# Patient Record
Sex: MALE | Race: WHITE | NOT HISPANIC OR LATINO | URBAN - METROPOLITAN AREA
[De-identification: names, ages, dates, MRNs, and addresses within clinical notes are randomized per-mention and may not be internally consistent; named-entity substitution may affect disease eponyms.]

---

## 2023-03-17 ENCOUNTER — INPATIENT (INPATIENT)
Facility: HOSPITAL | Age: 68
LOS: 2 days | Discharge: HOME | DRG: 862 | End: 2023-03-20
Attending: INTERNAL MEDICINE | Admitting: HOSPITALIST
Payer: COMMERCIAL

## 2023-03-17 VITALS
DIASTOLIC BLOOD PRESSURE: 64 MMHG | WEIGHT: 190.04 LBS | HEART RATE: 73 BPM | TEMPERATURE: 99 F | HEIGHT: 66 IN | SYSTOLIC BLOOD PRESSURE: 108 MMHG | RESPIRATION RATE: 18 BRPM | OXYGEN SATURATION: 98 %

## 2023-03-17 DIAGNOSIS — R18.8 OTHER ASCITES: ICD-10-CM

## 2023-03-17 LAB
ALBUMIN SERPL ELPH-MCNC: 2.7 G/DL — LOW (ref 3.3–5)
ALP SERPL-CCNC: 84 U/L — SIGNIFICANT CHANGE UP (ref 40–120)
ALT FLD-CCNC: 25 U/L — SIGNIFICANT CHANGE UP (ref 12–78)
ANION GAP SERPL CALC-SCNC: 6 MMOL/L — SIGNIFICANT CHANGE UP (ref 5–17)
APPEARANCE UR: CLEAR — SIGNIFICANT CHANGE UP
APTT BLD: 25.4 SEC — LOW (ref 27.5–35.5)
AST SERPL-CCNC: 30 U/L — SIGNIFICANT CHANGE UP (ref 15–37)
BACTERIA # UR AUTO: ABNORMAL
BASOPHILS # BLD AUTO: 0.06 K/UL — SIGNIFICANT CHANGE UP (ref 0–0.2)
BASOPHILS NFR BLD AUTO: 0.4 % — SIGNIFICANT CHANGE UP (ref 0–2)
BILIRUB SERPL-MCNC: 2.1 MG/DL — HIGH (ref 0.2–1.2)
BILIRUB UR-MCNC: NEGATIVE — SIGNIFICANT CHANGE UP
BUN SERPL-MCNC: 27 MG/DL — HIGH (ref 7–23)
CALCIUM SERPL-MCNC: 9.2 MG/DL — SIGNIFICANT CHANGE UP (ref 8.5–10.1)
CHLORIDE SERPL-SCNC: 103 MMOL/L — SIGNIFICANT CHANGE UP (ref 96–108)
CO2 SERPL-SCNC: 28 MMOL/L — SIGNIFICANT CHANGE UP (ref 22–31)
COLOR SPEC: YELLOW — SIGNIFICANT CHANGE UP
COMMENT - URINE: SIGNIFICANT CHANGE UP
CREAT SERPL-MCNC: 1.16 MG/DL — SIGNIFICANT CHANGE UP (ref 0.5–1.3)
DIFF PNL FLD: ABNORMAL
EGFR: 69 ML/MIN/1.73M2 — SIGNIFICANT CHANGE UP
EOSINOPHIL # BLD AUTO: 0.02 K/UL — SIGNIFICANT CHANGE UP (ref 0–0.5)
EOSINOPHIL NFR BLD AUTO: 0.1 % — SIGNIFICANT CHANGE UP (ref 0–6)
EPI CELLS # UR: SIGNIFICANT CHANGE UP
FLUAV AG NPH QL: SIGNIFICANT CHANGE UP
FLUBV AG NPH QL: SIGNIFICANT CHANGE UP
GLUCOSE SERPL-MCNC: 145 MG/DL — HIGH (ref 70–99)
GLUCOSE UR QL: NEGATIVE — SIGNIFICANT CHANGE UP
GRAN CASTS # UR COMP ASSIST: ABNORMAL /LPF
HCT VFR BLD CALC: 38.5 % — LOW (ref 39–50)
HGB BLD-MCNC: 13.1 G/DL — SIGNIFICANT CHANGE UP (ref 13–17)
HYALINE CASTS # UR AUTO: ABNORMAL /LPF
IMM GRANULOCYTES NFR BLD AUTO: 1.5 % — HIGH (ref 0–0.9)
INR BLD: 1.3 RATIO — HIGH (ref 0.88–1.16)
INR BLD: 1.37 RATIO — HIGH (ref 0.88–1.16)
KETONES UR-MCNC: NEGATIVE — SIGNIFICANT CHANGE UP
LACTATE SERPL-SCNC: 1.1 MMOL/L — SIGNIFICANT CHANGE UP (ref 0.7–2)
LEUKOCYTE ESTERASE UR-ACNC: ABNORMAL
LIDOCAIN IGE QN: 112 U/L — SIGNIFICANT CHANGE UP (ref 73–393)
LYMPHOCYTES # BLD AUTO: 0.54 K/UL — LOW (ref 1–3.3)
LYMPHOCYTES # BLD AUTO: 4 % — LOW (ref 13–44)
MCHC RBC-ENTMCNC: 30.7 PG — SIGNIFICANT CHANGE UP (ref 27–34)
MCHC RBC-ENTMCNC: 34 GM/DL — SIGNIFICANT CHANGE UP (ref 32–36)
MCV RBC AUTO: 90.2 FL — SIGNIFICANT CHANGE UP (ref 80–100)
MONOCYTES # BLD AUTO: 0.83 K/UL — SIGNIFICANT CHANGE UP (ref 0–0.9)
MONOCYTES NFR BLD AUTO: 6.2 % — SIGNIFICANT CHANGE UP (ref 2–14)
NEUTROPHILS # BLD AUTO: 11.73 K/UL — HIGH (ref 1.8–7.4)
NEUTROPHILS NFR BLD AUTO: 87.8 % — HIGH (ref 43–77)
NITRITE UR-MCNC: NEGATIVE — SIGNIFICANT CHANGE UP
PH UR: 5 — SIGNIFICANT CHANGE UP (ref 5–8)
PLATELET # BLD AUTO: 258 K/UL — SIGNIFICANT CHANGE UP (ref 150–400)
POTASSIUM SERPL-MCNC: 3.2 MMOL/L — LOW (ref 3.5–5.3)
POTASSIUM SERPL-SCNC: 3.2 MMOL/L — LOW (ref 3.5–5.3)
PROT SERPL-MCNC: 7.4 GM/DL — SIGNIFICANT CHANGE UP (ref 6–8.3)
PROT UR-MCNC: 30 MG/DL
PROTHROM AB SERPL-ACNC: 15.1 SEC — HIGH (ref 10.5–13.4)
PROTHROM AB SERPL-ACNC: 15.9 SEC — HIGH (ref 10.5–13.4)
RBC # BLD: 4.27 M/UL — SIGNIFICANT CHANGE UP (ref 4.2–5.8)
RBC # FLD: 12.3 % — SIGNIFICANT CHANGE UP (ref 10.3–14.5)
RBC CASTS # UR COMP ASSIST: SIGNIFICANT CHANGE UP /HPF (ref 0–4)
RSV RNA NPH QL NAA+NON-PROBE: SIGNIFICANT CHANGE UP
SARS-COV-2 RNA SPEC QL NAA+PROBE: SIGNIFICANT CHANGE UP
SODIUM SERPL-SCNC: 137 MMOL/L — SIGNIFICANT CHANGE UP (ref 135–145)
SP GR SPEC: 1.01 — SIGNIFICANT CHANGE UP (ref 1.01–1.02)
UROBILINOGEN FLD QL: 4
WBC # BLD: 13.38 K/UL — HIGH (ref 3.8–10.5)
WBC # FLD AUTO: 13.38 K/UL — HIGH (ref 3.8–10.5)
WBC UR QL: ABNORMAL /HPF (ref 0–5)

## 2023-03-17 PROCEDURE — 49406 IMAGE CATH FLUID PERI/RETRO: CPT

## 2023-03-17 PROCEDURE — 83735 ASSAY OF MAGNESIUM: CPT

## 2023-03-17 PROCEDURE — 87075 CULTR BACTERIA EXCEPT BLOOD: CPT

## 2023-03-17 PROCEDURE — 80053 COMPREHEN METABOLIC PANEL: CPT

## 2023-03-17 PROCEDURE — 85027 COMPLETE CBC AUTOMATED: CPT

## 2023-03-17 PROCEDURE — 36415 COLL VENOUS BLD VENIPUNCTURE: CPT

## 2023-03-17 PROCEDURE — 85025 COMPLETE CBC W/AUTO DIFF WBC: CPT

## 2023-03-17 PROCEDURE — C1769: CPT

## 2023-03-17 PROCEDURE — 84478 ASSAY OF TRIGLYCERIDES: CPT

## 2023-03-17 PROCEDURE — 80048 BASIC METABOLIC PNL TOTAL CA: CPT

## 2023-03-17 PROCEDURE — 99285 EMERGENCY DEPT VISIT HI MDM: CPT

## 2023-03-17 PROCEDURE — 74177 CT ABD & PELVIS W/CONTRAST: CPT | Mod: 26,ME

## 2023-03-17 PROCEDURE — G1004: CPT

## 2023-03-17 PROCEDURE — 86803 HEPATITIS C AB TEST: CPT

## 2023-03-17 PROCEDURE — 82570 ASSAY OF URINE CREATININE: CPT

## 2023-03-17 PROCEDURE — 71045 X-RAY EXAM CHEST 1 VIEW: CPT | Mod: 26

## 2023-03-17 PROCEDURE — 99223 1ST HOSP IP/OBS HIGH 75: CPT

## 2023-03-17 PROCEDURE — 84100 ASSAY OF PHOSPHORUS: CPT

## 2023-03-17 PROCEDURE — 87070 CULTURE OTHR SPECIMN AEROBIC: CPT

## 2023-03-17 PROCEDURE — 86140 C-REACTIVE PROTEIN: CPT

## 2023-03-17 PROCEDURE — 93010 ELECTROCARDIOGRAM REPORT: CPT

## 2023-03-17 PROCEDURE — C1729: CPT

## 2023-03-17 RX ORDER — ENOXAPARIN SODIUM 100 MG/ML
40 INJECTION SUBCUTANEOUS EVERY 24 HOURS
Refills: 0 | Status: DISCONTINUED | OUTPATIENT
Start: 2023-03-17 | End: 2023-03-20

## 2023-03-17 RX ORDER — PIPERACILLIN AND TAZOBACTAM 4; .5 G/20ML; G/20ML
3.38 INJECTION, POWDER, LYOPHILIZED, FOR SOLUTION INTRAVENOUS EVERY 8 HOURS
Refills: 0 | Status: DISCONTINUED | OUTPATIENT
Start: 2023-03-18 | End: 2023-03-20

## 2023-03-17 RX ORDER — IBUPROFEN 200 MG
200 TABLET ORAL EVERY 6 HOURS
Refills: 0 | Status: DISCONTINUED | OUTPATIENT
Start: 2023-03-17 | End: 2023-03-20

## 2023-03-17 RX ORDER — METOPROLOL TARTRATE 50 MG
1 TABLET ORAL
Qty: 0 | Refills: 0 | DISCHARGE

## 2023-03-17 RX ORDER — OXYCODONE HYDROCHLORIDE 5 MG/1
5 TABLET ORAL ONCE
Refills: 0 | Status: DISCONTINUED | OUTPATIENT
Start: 2023-03-17 | End: 2023-03-17

## 2023-03-17 RX ORDER — GABAPENTIN 400 MG/1
100 CAPSULE ORAL EVERY 8 HOURS
Refills: 0 | Status: DISCONTINUED | OUTPATIENT
Start: 2023-03-17 | End: 2023-03-20

## 2023-03-17 RX ORDER — FENTANYL CITRATE 50 UG/ML
50 INJECTION INTRAVENOUS
Refills: 0 | Status: DISCONTINUED | OUTPATIENT
Start: 2023-03-17 | End: 2023-03-17

## 2023-03-17 RX ORDER — IBUPROFEN 200 MG
1 TABLET ORAL
Qty: 0 | Refills: 0 | DISCHARGE

## 2023-03-17 RX ORDER — ONDANSETRON 8 MG/1
4 TABLET, FILM COATED ORAL ONCE
Refills: 0 | Status: DISCONTINUED | OUTPATIENT
Start: 2023-03-17 | End: 2023-03-17

## 2023-03-17 RX ORDER — VALSARTAN 80 MG/1
160 TABLET ORAL DAILY
Refills: 0 | Status: DISCONTINUED | OUTPATIENT
Start: 2023-03-17 | End: 2023-03-20

## 2023-03-17 RX ORDER — PIPERACILLIN AND TAZOBACTAM 4; .5 G/20ML; G/20ML
3.38 INJECTION, POWDER, LYOPHILIZED, FOR SOLUTION INTRAVENOUS ONCE
Refills: 0 | Status: COMPLETED | OUTPATIENT
Start: 2023-03-17 | End: 2023-03-17

## 2023-03-17 RX ORDER — METOPROLOL TARTRATE 50 MG
50 TABLET ORAL DAILY
Refills: 0 | Status: DISCONTINUED | OUTPATIENT
Start: 2023-03-17 | End: 2023-03-20

## 2023-03-17 RX ORDER — SODIUM CHLORIDE 9 MG/ML
2000 INJECTION INTRAMUSCULAR; INTRAVENOUS; SUBCUTANEOUS ONCE
Refills: 0 | Status: COMPLETED | OUTPATIENT
Start: 2023-03-17 | End: 2023-03-17

## 2023-03-17 RX ORDER — SODIUM CHLORIDE 9 MG/ML
1000 INJECTION, SOLUTION INTRAVENOUS
Refills: 0 | Status: DISCONTINUED | OUTPATIENT
Start: 2023-03-17 | End: 2023-03-17

## 2023-03-17 RX ADMIN — SODIUM CHLORIDE 2000 MILLILITER(S): 9 INJECTION INTRAMUSCULAR; INTRAVENOUS; SUBCUTANEOUS at 10:29

## 2023-03-17 RX ADMIN — PIPERACILLIN AND TAZOBACTAM 200 GRAM(S): 4; .5 INJECTION, POWDER, LYOPHILIZED, FOR SOLUTION INTRAVENOUS at 15:29

## 2023-03-17 NOTE — BRIEF OPERATIVE NOTE - OPERATION/FINDINGS
12F drain placed into pelvic collection, 360cc clear yellow fluid removed, drain left in place to gravity

## 2023-03-17 NOTE — H&P ADULT - HISTORY OF PRESENT ILLNESS
Pt is a pleasant 67 y/o Male with a PMHx of prostatectomy and lymph node removal presents to the ED sent in by urologist for accumulation of fluid in the peritoneal cavity. As per wife, the pt had viral sx for the past week with intermittent fevers, nausea and dry heaving, diarrhea, and general myalgias. Today, the pt was evaluated by urologist Dr. Ruiz for dark colored urine and hematuria who sent the pt to the ED for fluid retention in peritoneal cavity at site of lymph nodes removal. Pt is a pleasant 67 y/o Male with a PMHx of prostatectomy and lymph node removal in Dec 2022 at [facility] who was sent by his urologist, Dr. Ruiz,  to [place] ED for  pelvic pain.   Dr. Ruiz noted pt had  dark colored urine and hematuria.  He who found pt has  fluid retention,   accumulation of fluid in the peritoneal cavity,  at the site of lymph nodes removal.     For the  past week pt has had intermittent fevers/chills, nausea and dry heaving, diarrhea, and general myalgias.   He denies respiratory complaints,  no chest pain/SOB,  no urinary burning or pain.

## 2023-03-17 NOTE — H&P ADULT - NSHPOUTPATIENTPROVIDERS_GEN_ALL_CORE
Urologist: Dr Ruiz: [phone]. Urologist: Dr Ruiz: [phone].,  [facility]

## 2023-03-17 NOTE — ED PROVIDER NOTE - NS ED ROS FT
Gen: +fever, normal appetite  Eyes: No eye irritation or discharge  ENT: No ear pain, congestion, sore throat  Resp: No cough or trouble breathing  Cardiovascular: No chest pain or palpitation  Gastroenteric: +nausea, no vomiting, +diarrhea, constipation  :  +dark colored urine; no dysuria  MS: general myalgias  Skin: No rashes  Neuro: No headache; no abnormal movements  Remainder negative, except as per the HPI

## 2023-03-17 NOTE — H&P ADULT - NSHPPHYSICALEXAM_GEN_ALL_CORE
ICU Vital Signs Last 24 Hrs  T(C): 38.8 (17 Mar 2023 17:45), Max: 39.3 (17 Mar 2023 17:30)  T(F): 101.9 (17 Mar 2023 17:45), Max: 102.7 (17 Mar 2023 17:30)  HR: 75 (17 Mar 2023 17:45) (63 - 75)  BP: 124/66 (17 Mar 2023 17:45) (108/64 - 146/81)  BP(mean): 98 (17 Mar 2023 15:34) (48 - 98)    RR: 26 (17 Mar 2023 17:45) (18 - 30)   SpO2: 96% (17 Mar 2023 17:45) (95% - 99%)    O2 Parameters below as of 17 Mar 2023 17:45  Patient On (Oxygen Delivery Method): nasal cannula  O2 Flow (L/min): 2

## 2023-03-17 NOTE — ED PROVIDER NOTE - OBJECTIVE STATEMENT
69 y/o M with a PMHx of prostatectomy and lymph node removal presents to the ED sent in by urologist for accumulation of fluid in the peritoneal cavity. As per wife, the pt had viral sx for the past week with intermittent fevers, nausea and dry heaving, diarrhea, and general myalgias. Today, the pt was evaluated by urologist Dr. Ruiz for dark colored urine and hematuria who sent the pt to the ED for fluid retention in peritoneal cavity at site of lymph nodes removal. Urologist: Dr Ruiz: [phone].

## 2023-03-17 NOTE — PRE PROCEDURE NOTE - PRE PROCEDURE EVALUATION
67yo M, pt of Urologist Dr Ruiz [phone], s/p prostatectomy and lymph node excision 2 months ago with pelvic pain and low grade fevers since Tuesday. CT imaging significant for large anterior pelvic fluid collection. IR consulted for drainage. Pt seen in ED:  - Pt had yogurt at with fruit at 8:30AM, CT with oral contrast at noon  - Not on AC  - Pt is consentable, covid negative  - Case reviewed with Dr. Dougherty, plan for IR drainage today around 4pm

## 2023-03-17 NOTE — H&P ADULT - NSHPLABSRESULTS_GEN_ALL_CORE
My interpretation:    EKG:  NSR , 65 bpm          < from: CT Abdomen and Pelvis w/ Oral Cont and w/ IV Cont (03.17.23 @ 11:48) >  ACC: [id number] EXAM:  CT ABDOMEN AND PELVIS OC IC   ORDERED BY: WON MORENO     PROCEDURE DATE:  03/17/2023      INTERPRETATION:  CLINICAL INFORMATION: Abdominal pain    COMPARISON: None.    CONTRAST/COMPLICATIONS:  IV Contrast: Omnipaque 27999 cc administered   10 cc discarded  Oral Contrast: Omnipaque 300  Complications: None reported at time of study completion    PROCEDURE:  CT of the Abdomen and Pelvis was performed.  Sagittal and coronal reformats were performed.    FINDINGS:  LOWER CHEST: Basilar atelectasis and scarring. Coronary artery   calcifications.    LIVER: Multiple cysts.  BILE DUCTS: Normal caliber.  GALLBLADDER: Within normal limits.  SPLEEN: Within normal limits.  PANCREAS: Within normal limits.  ADRENALS: Within normal limits.  KIDNEYS/URETERS: Subcentimeter renal hypodensities, too small to further   characterize. No hydronephrosis.    BLADDER: Minimally distended. Wall thickening and inflammatory changes,   possibly reactive.  REPRODUCTIVE ORGANS: Prostatectomy.    BOWEL: No bowel obstruction. Appendix is within normal limits.  PERITONEUM: Lower abdominal/pelvic fluid collection measuring   approximately 13.1 x 7.3 x 9.1 cm and located anterior to the urinary   bladder.  VESSELS: Atherosclerotic changes.Accessory left renal arteries.  RETROPERITONEUM/LYMPH NODES: Prominent retroperitoneal lymph nodes.  ABDOMINAL WALL: Within normal limits.  BONES: Degenerative changes.    IMPRESSION:  Lower abdominal/pelvic fluid collection (13.1 x 7.3 x 9.1 cm) anterior to   the urinary bladder.  Urinary bladder wall thickening and inflammatory changes, possibly   reactive.      --- End of Report ---  < end of copied text >

## 2023-03-17 NOTE — PHARMACOTHERAPY INTERVENTION NOTE - COMMENTS
Medication reconciliation completed.  Reviewed Medication list and confirmed med allergies with patient; confirmed with Dr. First Meddejuan.

## 2023-03-17 NOTE — ED ADULT NURSE REASSESSMENT NOTE - NS ED NURSE REASSESS COMMENT FT1
Continued to assess patient reaction to antibiotic zosyn. Pt does not reports allergic reaction/hives. No s/s of allergic reaction noted. Pt remains in position of comfort. No signs of distress noted. Report given to IR RN. Pt transferred out of unit with zosyn infusion.

## 2023-03-17 NOTE — ED ADULT NURSE REASSESSMENT NOTE - NS ED NURSE REASSESS COMMENT FT1
Pt reports being allergic reaction to PNC. Pt reports hives occur. Pt reports that last time he had penicillin was in his early childhood. MD Kimbrough aware of allergic reaction to PNC. MD Kimbrough Ok to zosyn administration. Zosyn antibiotic started. Pt does not report allergic reaction. No allergic reaction noted. Pt remain in position comfort. No signs of distress noted.

## 2023-03-17 NOTE — ED ADULT NURSE NOTE - OBJECTIVE STATEMENT
68y male presents to the ED sent by urologist for accumulation of fluid in the peritoneal cavity. Wife at bedside. As per wife, "pt had a viral infection over the past week, with fevers, nausea, and diarrhea." Wife reports "he has dark yellow samira urine which made me think he had a UTI." Pt went to urologist office today and was adviced to come to ED for fluid retention. Pt is c/o RLQ pain when pressure is applied.

## 2023-03-17 NOTE — ED ADULT NURSE NOTE - CHIEF COMPLAINT QUOTE
pt underwent prostatectomy in December, had lymph nodes removed and now has accumulation of fluid in peritoneal cavity requiring cat scan imaging and possible "removal of fluid" as per wife. pt has been febrile x4 days, tmax 101. pt c/o RLQ abdominal pain as well as generalized weakness/dizziness and lightheadedness.

## 2023-03-17 NOTE — H&P ADULT - ASSESSMENT
Pt is admitted w/    Abd pain  Fever 102.7 F  Peritoneal fluid collection  Hypokalemia  Hx of Prostatectomy  UA  mildly positive, hematuria noted    - s/p Zosyn antibiotics in the ED  - f/u cultures  - replete potassium  - apprec IR consult and drainage by Dr. Dougherty  - ID consult  - DVT proph  -    Pt is admitted w/    Abd pain  Fever 102.7 F  Peritoneal fluid collection  Hypokalemia  Hx of Prostatectomy  UA  mildly positive, hematuria noted    - s/p Zosyn antibiotics in the ED  - f/u cultures  - replete potassium  - apprec IR consult and drainage by Dr. Dougherty  - Urology consult  - ID consult  - DVT proph: lovenox  -    Pt is admitted w/    Abd pain  Fever 102.7 F  Peritoneal fluid collection  CT showed : Lower abdominal/pelvic fluid collection measuring   approximately 13.1 x 7.3 x 9.1 cm and located anterior to the urinary   bladder.  Hypokalemia  Hx of Prostatectomy  UA  mildly positive, hematuria noted    - s/p Zosyn antibiotics in the ED, cont  -  s/p 2 L NS and LR maintenance IVF   - f/u cultures, and repeat potassium  - apprec IR consult and drainage by Dr. Dougherty  - Urology consult  - ID consult  - DVT proph: lovenox  - Full Code

## 2023-03-18 DIAGNOSIS — Z90.79 ACQUIRED ABSENCE OF OTHER GENITAL ORGAN(S): Chronic | ICD-10-CM

## 2023-03-18 DIAGNOSIS — C61 MALIGNANT NEOPLASM OF PROSTATE: ICD-10-CM

## 2023-03-18 DIAGNOSIS — R18.8 OTHER ASCITES: ICD-10-CM

## 2023-03-18 DIAGNOSIS — R32 UNSPECIFIED URINARY INCONTINENCE: ICD-10-CM

## 2023-03-18 LAB
ADD ON TEST-SPECIMEN IN LAB: SIGNIFICANT CHANGE UP
ANION GAP SERPL CALC-SCNC: 5 MMOL/L — SIGNIFICANT CHANGE UP (ref 5–17)
BUN SERPL-MCNC: 17 MG/DL — SIGNIFICANT CHANGE UP (ref 7–23)
CALCIUM SERPL-MCNC: 8.9 MG/DL — SIGNIFICANT CHANGE UP (ref 8.5–10.1)
CHLORIDE SERPL-SCNC: 103 MMOL/L — SIGNIFICANT CHANGE UP (ref 96–108)
CO2 SERPL-SCNC: 29 MMOL/L — SIGNIFICANT CHANGE UP (ref 22–31)
CREAT SERPL-MCNC: 0.89 MG/DL — SIGNIFICANT CHANGE UP (ref 0.5–1.3)
CULTURE RESULTS: SIGNIFICANT CHANGE UP
EGFR: 93 ML/MIN/1.73M2 — SIGNIFICANT CHANGE UP
GLUCOSE SERPL-MCNC: 92 MG/DL — SIGNIFICANT CHANGE UP (ref 70–99)
HCT VFR BLD CALC: 37.4 % — LOW (ref 39–50)
HCV AB S/CO SERPL IA: 0.1 S/CO — SIGNIFICANT CHANGE UP (ref 0–0.99)
HCV AB SERPL-IMP: SIGNIFICANT CHANGE UP
HGB BLD-MCNC: 12.6 G/DL — LOW (ref 13–17)
MAGNESIUM SERPL-MCNC: 2.2 MG/DL — SIGNIFICANT CHANGE UP (ref 1.6–2.6)
MCHC RBC-ENTMCNC: 30.5 PG — SIGNIFICANT CHANGE UP (ref 27–34)
MCHC RBC-ENTMCNC: 33.7 GM/DL — SIGNIFICANT CHANGE UP (ref 32–36)
MCV RBC AUTO: 90.6 FL — SIGNIFICANT CHANGE UP (ref 80–100)
PHOSPHATE SERPL-MCNC: 2.5 MG/DL — SIGNIFICANT CHANGE UP (ref 2.5–4.5)
PLATELET # BLD AUTO: 254 K/UL — SIGNIFICANT CHANGE UP (ref 150–400)
POTASSIUM SERPL-MCNC: 2.7 MMOL/L — CRITICAL LOW (ref 3.5–5.3)
POTASSIUM SERPL-SCNC: 2.7 MMOL/L — CRITICAL LOW (ref 3.5–5.3)
RBC # BLD: 4.13 M/UL — LOW (ref 4.2–5.8)
RBC # FLD: 12.2 % — SIGNIFICANT CHANGE UP (ref 10.3–14.5)
SODIUM SERPL-SCNC: 137 MMOL/L — SIGNIFICANT CHANGE UP (ref 135–145)
SPECIMEN SOURCE: SIGNIFICANT CHANGE UP
TRIGL FLD-MCNC: 10 MG/DL — SIGNIFICANT CHANGE UP
WBC # BLD: 8.39 K/UL — SIGNIFICANT CHANGE UP (ref 3.8–10.5)
WBC # FLD AUTO: 8.39 K/UL — SIGNIFICANT CHANGE UP (ref 3.8–10.5)

## 2023-03-18 PROCEDURE — 99222 1ST HOSP IP/OBS MODERATE 55: CPT

## 2023-03-18 PROCEDURE — 99232 SBSQ HOSP IP/OBS MODERATE 35: CPT

## 2023-03-18 RX ORDER — POTASSIUM CHLORIDE 20 MEQ
40 PACKET (EA) ORAL EVERY 4 HOURS
Refills: 0 | Status: COMPLETED | OUTPATIENT
Start: 2023-03-18 | End: 2023-03-18

## 2023-03-18 RX ADMIN — Medication 40 MILLIEQUIVALENT(S): at 09:16

## 2023-03-18 RX ADMIN — PIPERACILLIN AND TAZOBACTAM 25 GRAM(S): 4; .5 INJECTION, POWDER, LYOPHILIZED, FOR SOLUTION INTRAVENOUS at 01:00

## 2023-03-18 RX ADMIN — Medication 40 MILLIEQUIVALENT(S): at 13:52

## 2023-03-18 RX ADMIN — PIPERACILLIN AND TAZOBACTAM 25 GRAM(S): 4; .5 INJECTION, POWDER, LYOPHILIZED, FOR SOLUTION INTRAVENOUS at 16:15

## 2023-03-18 RX ADMIN — VALSARTAN 160 MILLIGRAM(S): 80 TABLET ORAL at 09:14

## 2023-03-18 RX ADMIN — Medication 50 MILLIGRAM(S): at 09:13

## 2023-03-18 RX ADMIN — PIPERACILLIN AND TAZOBACTAM 25 GRAM(S): 4; .5 INJECTION, POWDER, LYOPHILIZED, FOR SOLUTION INTRAVENOUS at 21:14

## 2023-03-18 RX ADMIN — PIPERACILLIN AND TAZOBACTAM 25 GRAM(S): 4; .5 INJECTION, POWDER, LYOPHILIZED, FOR SOLUTION INTRAVENOUS at 09:13

## 2023-03-18 RX ADMIN — ENOXAPARIN SODIUM 40 MILLIGRAM(S): 100 INJECTION SUBCUTANEOUS at 05:52

## 2023-03-18 NOTE — PROGRESS NOTE ADULT - SUBJECTIVE AND OBJECTIVE BOX
Subjective:  Chief complain :  Abd pain, Fever 102.7 F      HPI:      69 y/o Male with a PMHx of prostatectomy and lymph node removal in Dec 2022 at [facility] who was sent by his urologist, Dr. Ruiz,  to [place] ED on 3/17/23  for  pelvic pain.   Dr. Ruiz noted pt had  dark colored urine and hematuria.  He who found pt has  fluid retention,   accumulation of fluid in the peritoneal cavity,  at the site of lymph nodes removal.  For the  past week pt has had intermittent fevers/chills, nausea and dry heaving, diarrhea, and general myalgias.   He denies respiratory complaints,  no chest pain/SOB,  no urinary burning or pain.       3/18 -  Patient seen and examined at bedside earlier today, denies cp, dyspnea, cough, abdominal pain minimal at site of the drain, tolerating po intake, afebrile    Review of system- Rest of the review of system are negative except mentioned in HPI     Vital sings reviewed for last 24 h  T(C): 37.3 (23 @ 15:38), Max: 37.3 (23 @ 15:38)  HR: 64 (23 @ 15:38) (56 - 77)  BP: 99/52 (23 @ 15:38) (99/52 - 132/60)  RR: 18 (23 @ 15:38) (18 - 18)  SpO2: 97% (23 @ 15:38) (95% - 98%)  Wt(kg): --  Daily     Daily   CAPILLARY BLOOD GLUCOSE    Physical exam:   General : NAD, appear to be of stated age , well groomed   NERVOUS SYSTEM:  Alert & Oriented X3, non- focal exam, Motor Strength 5/5 B/L upper and lower extremities; DTRs 2+ intact and symmetric  HEAD:  Atraumatic, Normocephalic  EYES: EOMI, PERRLA, conjunctiva and sclera clear  HEENT: Moist mucous membranes, Supple neck , No JVD  CHEST: Clear to auscultation bilaterally; No rales, no rhonchi, no wheezing  HEART: Regular rate and rhythm; No murmurs, no rubs or gallops  ABDOMEN: Soft, Non-tender, Non-distended; Bowel sounds present, no guarding , no peritoneal irritation , RLQ drain in place  GENITOURINARY- Voiding, no suprapubic tenderness  EXTREMITIES:  2+ Peripheral Pulses, No clubbing, cyanosis,   edema  MUSCULOSKELETAL:- No muscle tenderness, Muscle tone normal, No joint tenderness, no Joint swelling,  Joint ROM –normal   SKIN-no rash, no lesion    Labs radiologic and other test : all reviewed and interpret                         12.6   8.39  )-----------( 254      ( 18 Mar 2023 08:05 )             37.4     03-18    137  |  103  |  17  ----------------------------<  92  2.7<LL>   |  29  |  0.89    Ca    8.9      18 Mar 2023 08:05  Phos  2.5     03-18  Mg     2.2     03-18    TPro  7.4  /  Alb  2.7<L>  /  TBili  2.1<H>  /  DBili  x   /  AST  30  /  ALT  25  /  AlkPhos  84  03-17    PT/INR - ( 17 Mar 2023 12:51 )   PT: 15.9 sec;   INR: 1.37 ratio         PTT - ( 17 Mar 2023 12:51 )  PTT:25.4 sec        Urinalysis Basic - ( 17 Mar 2023 11:16 )    Color: Yellow / Appearance: Clear / S.010 / pH: x  Gluc: x / Ketone: Negative  / Bili: Negative / Urobili: 4   Blood: x / Protein: 30 mg/dL / Nitrite: Negative   Leuk Esterase: Trace / RBC: 0-2 /HPF / WBC 6-10 /HPF   Sq Epi: x / Non Sq Epi: Occasional / Bacteria: Occasional     CT Abdomen and Pelvis w/ Oral Cont and w/ IV Cont (23 @ 11:48) >    FINDINGS:  LOWER CHEST: Basilar atelectasis and scarring. Coronary artery   calcifications.    LIVER: Multiple cysts.  BILE DUCTS: Normal caliber.  GALLBLADDER: Within normal limits.  SPLEEN: Within normal limits.  PANCREAS: Within normal limits.  ADRENALS: Within normal limits.  KIDNEYS/URETERS: Subcentimeter renal hypodensities, too small to further   characterize. No hydronephrosis.    BLADDER: Minimally distended. Wall thickening and inflammatory changes,   possibly reactive.  REPRODUCTIVE ORGANS: Prostatectomy.    BOWEL: No bowel obstruction. Appendix is within normal limits.  PERITONEUM: Lower abdominal/pelvic fluid collection measuring   approximately 13.1 x 7.3 x 9.1 cm and located anterior to the urinary   bladder.  VESSELS: Atherosclerotic changes.Accessory left renal arteries.  RETROPERITONEUM/LYMPH NODES: Prominent retroperitoneal lymph nodes.  ABDOMINAL WALL: Within normal limits.  BONES: Degenerative changes.    IMPRESSION:  Lower abdominal/pelvic fluid collection (13.1 x 7.3 x 9.1 cm) anterior to   the urinary bladder.  Urinary bladder wall thickening and inflammatory changes, possibly   reactive.     Xray Chest 1 View- PORTABLE-Urgent (23 @ 11:03) >  There is a small leftpleural effusion. The heart size is mildly   enlarged. There is levoscoliosis of the upper thoracic spine.    Impression: Small left pleural effusion. Mild cardiomegaly.            RECENT CULTURES:  Culture - Abscess with Gram Stain (23 @ 16:46)    Specimen Source: .Abscess pelvic drainage    Culture Results:   Moderate Streptococcus agalactiae (Group B) isolated  Group B streptococci are susceptible to ampicillin,  penicillin and cefazolin, but may be resistant to  erythromycin and clindamycin.  Recommendations for intrapartum prophylaxis for Group B  streptococci are penicillin or ampicillin.    Culture - Urine (23 @ 11:16)    Specimen Source: Clean Catch None    Culture Results:   <10,000 CFU/mL Normal Urogenital Crys    Culture - Blood (23 @ 10:27)    Specimen Source: .Blood None    Culture Results:   No growth to date.        Cardiac testing : reviewed   EKG  3/17 - NSR 65 , no ischemic changes     Procedures :     Devices:     Current medications:  enoxaparin Injectable 40 milliGRAM(s) SubCutaneous every 24 hours  gabapentin 100 milliGRAM(s) Oral every 8 hours  hydrochlorothiazide 25 milliGRAM(s) Oral daily  ibuprofen  Tablet. 200 milliGRAM(s) Oral every 6 hours PRN  metoprolol succinate ER 50 milliGRAM(s) Oral daily  piperacillin/tazobactam IVPB.. 3.375 Gram(s) IV Intermittent every 8 hours  valsartan 160 milliGRAM(s) Oral daily

## 2023-03-18 NOTE — CONSULT NOTE ADULT - SUBJECTIVE AND OBJECTIVE BOX
CHIEF COMPLAINT:  Pelvic collection.     HISTORY OF PRESENT ILLNESS:  67 yo male admitted to the hospital after presenting with pelvic pain.     On CT scan A&P: Lower abdominal/pelvic fluid collection (13.1 x 7.3 x 9.1 cm) anterior to the urinary bladder.  S/p IR drain.       s/p Radical prostatectomy and lymph node removal for Prostate cancer in 2022 at [facility] with Dr Delacruz.   Had noted dark colored urine and hematuria.   For the past week Patient had intermittent fevers/chills, nausea and dry heaving, diarrhea, and general myalgias.   Since surgery urinates every 2 to 3 hours or so and nocturia of 2 to 3 x. Has urinary incontinence, more with cough and sneeze.   Has on and off hesitancy. Was checked for urinary retention not much post void residual.       PAST MEDICAL & SURGICAL HISTORY:  Cancer of prostate      H/O prostatectomy          REVIEW OF SYSTEMS:  All other review of systems is negative unless indicated above.    MEDICATIONS  (STANDING):  enoxaparin Injectable 40 milliGRAM(s) SubCutaneous every 24 hours  gabapentin 100 milliGRAM(s) Oral every 8 hours  hydrochlorothiazide 25 milliGRAM(s) Oral daily  metoprolol succinate ER 50 milliGRAM(s) Oral daily  piperacillin/tazobactam IVPB.. 3.375 Gram(s) IV Intermittent every 8 hours  valsartan 160 milliGRAM(s) Oral daily    MEDICATIONS  (PRN):  ibuprofen  Tablet. 200 milliGRAM(s) Oral every 6 hours PRN Moderate Pain (4 - 6)      Allergies    Darvon (Unknown)  penicillin (Unknown)    Intolerances        SOCIAL HISTORY:    FAMILY HISTORY:  Family history of hypertension        Vital Signs Last 24 Hrs  T(C): 37.3 (18 Mar 2023 15:38), Max: 37.6 (17 Mar 2023 18:41)  T(F): 99.1 (18 Mar 2023 15:38), Max: 99.7 (17 Mar 2023 18:41)  HR: 64 (18 Mar 2023 15:38) (56 - 77)  BP: 99/52 (18 Mar 2023 15:38) (99/52 - 132/60)  BP(mean): --  RR: 18 (18 Mar 2023 15:38) (18 - 18)  SpO2: 97% (18 Mar 2023 15:38) (95% - 98%)    Parameters below as of 18 Mar 2023 15:38  Patient On (Oxygen Delivery Method): room air        PHYSICAL EXAM:    Constitutional: No acute distress  HEENT: EOMI, Normal Hearing  Neck: Supple  Back: No costovertebral angle tenderness  Respiratory: Normal respiratory effort    Cardiovascular: Normal peripheral circulation   Abd: Soft, non distended, non tender, IR drain: seropurulent discharge   Extremities: No peripheral edema  Neurological: No focal deficits  Psychiatric: Normal mood, normal affect  Musculoskeletal: Moving all 4 extremities  Skin: No rashes    LABS:                        12.6   8.39  )-----------( 254      ( 18 Mar 2023 08:05 )             37.4     03-18    137  |  103  |  17  ----------------------------<  92  2.7<LL>   |  29  |  0.89    Ca    8.9      18 Mar 2023 08:05  Phos  2.5     03-18  Mg     2.2     03-18    TPro  7.4  /  Alb  2.7<L>  /  TBili  2.1<H>  /  DBili  x   /  AST  30  /  ALT  25  /  AlkPhos  84  03-17    PT/INR - ( 17 Mar 2023 12:51 )   PT: 15.9 sec;   INR: 1.37 ratio         PTT - ( 17 Mar 2023 12:51 )  PTT:25.4 sec  Urinalysis Basic - ( 17 Mar 2023 11:16 )    Color: Yellow / Appearance: Clear / S.010 / pH: x  Gluc: x / Ketone: Negative  / Bili: Negative / Urobili: 4   Blood: x / Protein: 30 mg/dL / Nitrite: Negative   Leuk Esterase: Trace / RBC: 0-2 /HPF / WBC 6-10 /HPF   Sq Epi: x / Non Sq Epi: Occasional / Bacteria: Occasional      < from: CT Abdomen and Pelvis w/ Oral Cont and w/ IV Cont (23 @ 11:48) >  ACC: [id number] EXAM:  CT ABDOMEN AND PELVIS OC IC   ORDERED BY: WON MORENO     PROCEDURE DATE:  2023          INTERPRETATION:  CLINICAL INFORMATION: Abdominal pain    COMPARISON: None.    CONTRAST/COMPLICATIONS:  IV Contrast: Omnipaque 63945 cc administered   10 cc discarded  Oral Contrast: Omnipaque 300  Complications: None reported at time of study completion    PROCEDURE:  CT of the Abdomen and Pelvis was performed.  Sagittal and coronal reformats were performed.    FINDINGS:  LOWER CHEST: Basilar atelectasis and scarring. Coronary artery   calcifications.    LIVER: Multiple cysts.  BILE DUCTS: Normal caliber.  GALLBLADDER: Within normal limits.  SPLEEN: Within normal limits.  PANCREAS: Within normal limits.  ADRENALS: Within normal limits.  KIDNEYS/URETERS: Subcentimeter renal hypodensities, too small to further   characterize. No hydronephrosis.    BLADDER: Minimally distended. Wall thickening and inflammatory changes,   possibly reactive.  REPRODUCTIVE ORGANS: Prostatectomy.    BOWEL: No bowel obstruction. Appendix is within normal limits.  PERITONEUM: Lower abdominal/pelvic fluid collection measuring   approximately 13.1 x 7.3 x 9.1 cm and located anterior to the urinary   bladder.  VESSELS: Atherosclerotic changes.Accessory left renal arteries.  RETROPERITONEUM/LYMPH NODES: Prominent retroperitoneal lymph nodes.  ABDOMINAL WALL: Within normal limits.  BONES: Degenerative changes.    IMPRESSION:  Lower abdominal/pelvic fluid collection (13.1 x 7.3 x 9.1 cm) anterior to   the urinary bladder.  Urinary bladder wall thickening and inflammatory changes, possibly     < end of copied text >

## 2023-03-18 NOTE — CONSULT NOTE ADULT - ASSESSMENT
s/p IR drain of pelvic collection.   Continue antibiotics, follow cultures, treat per sensitivity.   Bladder scan to check post void residual.     Follow up with Dr Delacruz.

## 2023-03-18 NOTE — CONSULT NOTE ADULT - ASSESSMENT
67 y/o Male with h/o prostatectomy and lymph node removal in Dec 2022 at [facility] was admitted on 3/17 after he was sent by his urologist, Dr. Ruiz to [place] ED for worsening pelvic pain. Dr. Ruiz noted pt had dark colored urine and hematuria. He was also noted with accumulation of fluid in the peritoneal cavity at the site of lymph nodes removal. For the  past week pt has had intermittent fevers/chills, nausea and dry heaving, diarrhea, and general myalgias. In ER he received zosyn. On 3/18 drain placed into pelvic collection, 360cc clear yellow fluid removed, drain left in place    1. Febrile syndrome. Possible sepsis. Pelvic collection. ?abscess vs lymphatic fluid collection s/p drainage. Recent prostatectomy. Allergy to PCN.   -obtain BC x 2, fluid collection c/s  -agree with zosyn 3.375 gm IV q8h   67 y/o Male with h/o prostate Ca s/p prostatectomy and lymph node removal in Dec 2022 at [facility] was admitted on 3/17 after he was sent by his urologist, Dr. Ruiz to [place] ED for worsening pelvic pain. Dr. Ruiz noted pt had dark colored urine and hematuria. He was also noted with accumulation of fluid in the peritoneal cavity at the site of lymph nodes removal. For the  past week pt has had intermittent fevers/chills, nausea and dry heaving, diarrhea, and general myalgias. In ER he received zosyn. On 3/18 drain placed into pelvic collection, 360cc clear yellow fluid removed, drain left in place    1. Febrile syndrome. Possible sepsis. Pelvic collection. ?abscess vs lymphatic fluid collection s/p drainage. Prostate Ca s/p recent prostatectomy. Allergy to PCN.   -obtain BC x 2, fluid collection c/s  -he tolerated zosyn well so far despite his h/o PCN allergy  -abs choice d/w patient - since he had no reactions so far, will continue same, but it is possible that he may get delayed reactions  -agree with zosyn 3.375 gm IV q8h for now  -reason for abx use and side effects reviewed with patient; monitor BMP   -monitor closely in [place] of PCN allergy history  -f/u cultures  -old chart reviewed to assess prior cultures  -monitor temps  -f/u CBC  -supportive care  2. Other issues:   -care per medicine    d/w Dr. Lloyd

## 2023-03-18 NOTE — CONSULT NOTE ADULT - SUBJECTIVE AND OBJECTIVE BOX
Patient is a 68y old  Male who presents with a chief complaint of Abd pain  Fever 102.7 F  Peritoneal fluid collection     HPI:  67 y/o Male with h/o prostatectomy and lymph node removal in Dec 2022 at [facility] was admitted on 3/17 after he was sent by his urologist, Dr. Ruiz to [place] ED for worsening pelvic pain. Dr. Ruiz noted pt had dark colored urine and hematuria. He was also noted with accumulation of fluid in the peritoneal cavity at the site of lymph nodes removal. For the  past week pt has had intermittent fevers/chills, nausea and dry heaving, diarrhea, and general myalgias. In ER he received zosyn.     PMH: as above  PSH: as above  Meds: per reconciliation sheet, noted below  MEDICATIONS  (STANDING):  enoxaparin Injectable 40 milliGRAM(s) SubCutaneous every 24 hours  gabapentin 100 milliGRAM(s) Oral every 8 hours  hydrochlorothiazide 25 milliGRAM(s) Oral daily  metoprolol succinate ER 50 milliGRAM(s) Oral daily  piperacillin/tazobactam IVPB.. 3.375 Gram(s) IV Intermittent every 8 hours  valsartan 160 milliGRAM(s) Oral daily    MEDICATIONS  (PRN):  ibuprofen  Tablet. 200 milliGRAM(s) Oral every 6 hours PRN Moderate Pain (4 - 6)    Allergies    Darvon (Unknown)  penicillin (Unknown)    Intolerances      Social: no smoking, no alcohol, no illegal drugs; no recent travel, no exposure to TB  FAMILY HISTORY:  Family history of hypertension      no history of premature cardiovascular disease in first degree relatives    ROS: the patient denies fever, no chills, no HA, no seizures, no dizziness, no sore throat, no nasal congestion, no blurry vision, no CP, no palpitations, no SOB, no cough, no abdominal pain, no diarrhea, no N/V, no dysuria, no leg pain, no claudication, no rash, no joint aches, no rectal pain or bleeding, no night sweats  All other systems reviewed and are negative    Vital Signs Last 24 Hrs  T(C): 37.3 (18 Mar 2023 15:38), Max: 37.6 (17 Mar 2023 18:41)  T(F): 99.1 (18 Mar 2023 15:38), Max: 99.7 (17 Mar 2023 18:41)  HR: 64 (18 Mar 2023 15:38) (56 - 77)  BP: 99/52 (18 Mar 2023 15:38) (99/52 - 132/60)  BP(mean): --  RR: 18 (18 Mar 2023 15:38) (18 - 18)  SpO2: 97% (18 Mar 2023 15:38) (95% - 98%)    Parameters below as of 18 Mar 2023 15:38  Patient On (Oxygen Delivery Method): room air    PE:    Constitutional:  No acute distress  HEENT: NC/AT, EOMI, PERRLA, conjunctivae clear; ears and nose atraumatic; pharynx benign  Neck: supple; thyroid not palpable  Back: no tenderness  Respiratory: respiratory effort normal; clear to auscultation  Cardiovascular: S1S2 regular, no murmurs  Abdomen: soft, not tender, not distended, positive BS; no liver or spleen organomegaly  Genitourinary: no suprapubic tenderness  Lymphatic: no LN palpable  Musculoskeletal: no muscle tenderness, no joint swelling or tenderness  Extremities: no pedal edema  Neurological/ Psychiatric: AxOx3, judgement and insight normal; moving all extremities  Skin: no rashes; no palpable lesions    Labs: all available labs reviewed                        12.6   8.39  )-----------( 254      ( 18 Mar 2023 08:05 )             37.4     03-18    137  |  103  |  17  ----------------------------<  92  2.7<LL>   |  29  |  0.89    Ca    8.9      18 Mar 2023 08:05  Phos  2.5     03-18  Mg     2.2     03-18    TPro  7.4  /  Alb  2.7<L>  /  TBili  2.1<H>  /  DBili  x   /  AST  30  /  ALT  25  /  AlkPhos  84  03-17     LIVER FUNCTIONS - ( 17 Mar 2023 10:27 )  Alb: 2.7 g/dL / Pro: 7.4 gm/dL / ALK PHOS: 84 U/L / ALT: 25 U/L / AST: 30 U/L / GGT: x           Urinalysis Basic - ( 17 Mar 2023 11:16 )    Color: Yellow / Appearance: Clear / S.010 / pH: x  Gluc: x / Ketone: Negative  / Bili: Negative / Urobili: 4   Blood: x / Protein: 30 mg/dL / Nitrite: Negative   Leuk Esterase: Trace / RBC: 0-2 /HPF / WBC 6-10 /HPF   Sq Epi: x / Non Sq Epi: Occasional / Bacteria: Occasional    Radiology: all available radiological tests reviewed    < from: CT Abdomen and Pelvis w/ Oral Cont and w/ IV Cont (23 @ 11:48) >  Lower abdominal/pelvic fluid collection (13.1 x 7.3 x 9.1 cm) anterior to the urinary bladder.  Urinary bladder wall thickening and inflammatory changes, possibly reactive.  < end of copied text >    Advanced directives addressed: full resuscitation Patient is a 68y old  Male who presents with a chief complaint of Abd pain  Fever 102.7 F  Peritoneal fluid collection     HPI:  69 y/o Male with h/o prostate Ca s/p prostatectomy and lymph node removal in Dec 2022 at [facility] was admitted on 3/17 after he was sent by his urologist, Dr. Ruiz to [place] ED for worsening pelvic pain. Dr. Delacruz noted pt had dark colored urine and hematuria. He was also noted with accumulation of fluid in the peritoneal cavity at the site of lymph nodes removal. For the  past week pt has had intermittent fevers/chills, nausea and dry heaving, diarrhea, and general myalgias. In ER he received zosyn.     PMH: as above  PSH: as above  Meds: per reconciliation sheet, noted below  MEDICATIONS  (STANDING):  enoxaparin Injectable 40 milliGRAM(s) SubCutaneous every 24 hours  gabapentin 100 milliGRAM(s) Oral every 8 hours  hydrochlorothiazide 25 milliGRAM(s) Oral daily  metoprolol succinate ER 50 milliGRAM(s) Oral daily  piperacillin/tazobactam IVPB.. 3.375 Gram(s) IV Intermittent every 8 hours  valsartan 160 milliGRAM(s) Oral daily    MEDICATIONS  (PRN):  ibuprofen  Tablet. 200 milliGRAM(s) Oral every 6 hours PRN Moderate Pain (4 - 6)    Allergies    Darvon (Unknown)  penicillin - hives    Intolerances      Social: no smoking, no alcohol, no illegal drugs; no recent travel, no exposure to TB  FAMILY HISTORY:  Family history of hypertension      no history of premature cardiovascular disease in first degree relatives    ROS: the patient denies fever, no chills, no HA, no seizures, no dizziness, no sore throat, no nasal congestion, no blurry vision, no CP, no palpitations, no SOB, no cough, no abdominal pain, no diarrhea, no N/V, no dysuria, no leg pain, no claudication, no rash, no joint aches, no rectal pain or bleeding, no night sweats  All other systems reviewed and are negative    Vital Signs Last 24 Hrs  T(C): 37.3 (18 Mar 2023 15:38), Max: 37.6 (17 Mar 2023 18:41)  T(F): 99.1 (18 Mar 2023 15:38), Max: 99.7 (17 Mar 2023 18:41)  HR: 64 (18 Mar 2023 15:38) (56 - 77)  BP: 99/52 (18 Mar 2023 15:38) (99/52 - 132/60)  BP(mean): --  RR: 18 (18 Mar 2023 15:38) (18 - 18)  SpO2: 97% (18 Mar 2023 15:38) (95% - 98%)    Parameters below as of 18 Mar 2023 15:38  Patient On (Oxygen Delivery Method): room air    PE:    Constitutional:  No acute distress  HEENT: NC/AT, EOMI, PERRLA, conjunctivae clear; ears and nose atraumatic; pharynx benign  Neck: supple; thyroid not palpable  Back: no tenderness  Respiratory: respiratory effort normal; clear to auscultation  Cardiovascular: S1S2 regular, no murmurs  Abdomen: soft, not tender, not distended, positive BS; no liver or spleen organomegaly  Genitourinary: no suprapubic tenderness  Lymphatic: no LN palpable  Musculoskeletal: no muscle tenderness, no joint swelling or tenderness  Extremities: no pedal edema  Neurological/ Psychiatric: AxOx3, judgement and insight normal; moving all extremities  Skin: no rashes; no palpable lesions    Labs: all available labs reviewed                        12.6   8.39  )-----------( 254      ( 18 Mar 2023 08:05 )             37.4     03-18    137  |  103  |  17  ----------------------------<  92  2.7<LL>   |  29  |  0.89    Ca    8.9      18 Mar 2023 08:05  Phos  2.5     03-18  Mg     2.2     03-18    TPro  7.4  /  Alb  2.7<L>  /  TBili  2.1<H>  /  DBili  x   /  AST  30  /  ALT  25  /  AlkPhos  84  03-17     LIVER FUNCTIONS - ( 17 Mar 2023 10:27 )  Alb: 2.7 g/dL / Pro: 7.4 gm/dL / ALK PHOS: 84 U/L / ALT: 25 U/L / AST: 30 U/L / GGT: x           Urinalysis Basic - ( 17 Mar 2023 11:16 )    Color: Yellow / Appearance: Clear / S.010 / pH: x  Gluc: x / Ketone: Negative  / Bili: Negative / Urobili: 4   Blood: x / Protein: 30 mg/dL / Nitrite: Negative   Leuk Esterase: Trace / RBC: 0-2 /HPF / WBC 6-10 /HPF   Sq Epi: x / Non Sq Epi: Occasional / Bacteria: Occasional    Radiology: all available radiological tests reviewed    < from: CT Abdomen and Pelvis w/ Oral Cont and w/ IV Cont (23 @ 11:48) >  Lower abdominal/pelvic fluid collection (13.1 x 7.3 x 9.1 cm) anterior to the urinary bladder.  Urinary bladder wall thickening and inflammatory changes, possibly reactive.  < end of copied text >    Advanced directives addressed: full resuscitation

## 2023-03-18 NOTE — CONSULT NOTE ADULT - REASON FOR ADMISSION
Abd pain  Fever 102.7 F  Peritoneal fluid collection
Abd pain  Fever 102.7 F  Peritoneal fluid collection

## 2023-03-19 LAB
ALBUMIN SERPL ELPH-MCNC: 2.4 G/DL — LOW (ref 3.3–5)
ALP SERPL-CCNC: 97 U/L — SIGNIFICANT CHANGE UP (ref 40–120)
ALT FLD-CCNC: 47 U/L — SIGNIFICANT CHANGE UP (ref 12–78)
ANION GAP SERPL CALC-SCNC: 4 MMOL/L — LOW (ref 5–17)
AST SERPL-CCNC: 37 U/L — SIGNIFICANT CHANGE UP (ref 15–37)
BASOPHILS # BLD AUTO: 0.05 K/UL — SIGNIFICANT CHANGE UP (ref 0–0.2)
BASOPHILS NFR BLD AUTO: 0.7 % — SIGNIFICANT CHANGE UP (ref 0–2)
BILIRUB SERPL-MCNC: 0.7 MG/DL — SIGNIFICANT CHANGE UP (ref 0.2–1.2)
BUN SERPL-MCNC: 18 MG/DL — SIGNIFICANT CHANGE UP (ref 7–23)
CALCIUM SERPL-MCNC: 9.3 MG/DL — SIGNIFICANT CHANGE UP (ref 8.5–10.1)
CHLORIDE SERPL-SCNC: 105 MMOL/L — SIGNIFICANT CHANGE UP (ref 96–108)
CO2 SERPL-SCNC: 29 MMOL/L — SIGNIFICANT CHANGE UP (ref 22–31)
CREAT FLD-MCNC: 1.06 MG/DL — SIGNIFICANT CHANGE UP
CREAT SERPL-MCNC: 1.02 MG/DL — SIGNIFICANT CHANGE UP (ref 0.5–1.3)
CRP SERPL-MCNC: 106 MG/L — HIGH
EGFR: 80 ML/MIN/1.73M2 — SIGNIFICANT CHANGE UP
EOSINOPHIL # BLD AUTO: 0.31 K/UL — SIGNIFICANT CHANGE UP (ref 0–0.5)
EOSINOPHIL NFR BLD AUTO: 4.3 % — SIGNIFICANT CHANGE UP (ref 0–6)
GLUCOSE SERPL-MCNC: 110 MG/DL — HIGH (ref 70–99)
HCT VFR BLD CALC: 39.4 % — SIGNIFICANT CHANGE UP (ref 39–50)
HGB BLD-MCNC: 13 G/DL — SIGNIFICANT CHANGE UP (ref 13–17)
IMM GRANULOCYTES NFR BLD AUTO: 1.3 % — HIGH (ref 0–0.9)
LYMPHOCYTES # BLD AUTO: 1.19 K/UL — SIGNIFICANT CHANGE UP (ref 1–3.3)
LYMPHOCYTES # BLD AUTO: 16.6 % — SIGNIFICANT CHANGE UP (ref 13–44)
MAGNESIUM SERPL-MCNC: 2.2 MG/DL — SIGNIFICANT CHANGE UP (ref 1.6–2.6)
MCHC RBC-ENTMCNC: 29.9 PG — SIGNIFICANT CHANGE UP (ref 27–34)
MCHC RBC-ENTMCNC: 33 GM/DL — SIGNIFICANT CHANGE UP (ref 32–36)
MCV RBC AUTO: 90.6 FL — SIGNIFICANT CHANGE UP (ref 80–100)
MONOCYTES # BLD AUTO: 0.79 K/UL — SIGNIFICANT CHANGE UP (ref 0–0.9)
MONOCYTES NFR BLD AUTO: 11 % — SIGNIFICANT CHANGE UP (ref 2–14)
NEUTROPHILS # BLD AUTO: 4.75 K/UL — SIGNIFICANT CHANGE UP (ref 1.8–7.4)
NEUTROPHILS NFR BLD AUTO: 66.1 % — SIGNIFICANT CHANGE UP (ref 43–77)
PHOSPHATE SERPL-MCNC: 3.2 MG/DL — SIGNIFICANT CHANGE UP (ref 2.5–4.5)
PLATELET # BLD AUTO: 280 K/UL — SIGNIFICANT CHANGE UP (ref 150–400)
POTASSIUM SERPL-MCNC: 3.2 MMOL/L — LOW (ref 3.5–5.3)
POTASSIUM SERPL-SCNC: 3.2 MMOL/L — LOW (ref 3.5–5.3)
PROT SERPL-MCNC: 6.4 GM/DL — SIGNIFICANT CHANGE UP (ref 6–8.3)
RBC # BLD: 4.35 M/UL — SIGNIFICANT CHANGE UP (ref 4.2–5.8)
RBC # FLD: 12.1 % — SIGNIFICANT CHANGE UP (ref 10.3–14.5)
SODIUM SERPL-SCNC: 138 MMOL/L — SIGNIFICANT CHANGE UP (ref 135–145)
WBC # BLD: 7.18 K/UL — SIGNIFICANT CHANGE UP (ref 3.8–10.5)
WBC # FLD AUTO: 7.18 K/UL — SIGNIFICANT CHANGE UP (ref 3.8–10.5)

## 2023-03-19 PROCEDURE — 99232 SBSQ HOSP IP/OBS MODERATE 35: CPT

## 2023-03-19 RX ADMIN — PIPERACILLIN AND TAZOBACTAM 25 GRAM(S): 4; .5 INJECTION, POWDER, LYOPHILIZED, FOR SOLUTION INTRAVENOUS at 05:42

## 2023-03-19 RX ADMIN — VALSARTAN 160 MILLIGRAM(S): 80 TABLET ORAL at 10:22

## 2023-03-19 RX ADMIN — Medication 50 MILLIGRAM(S): at 10:22

## 2023-03-19 RX ADMIN — ENOXAPARIN SODIUM 40 MILLIGRAM(S): 100 INJECTION SUBCUTANEOUS at 05:41

## 2023-03-19 RX ADMIN — PIPERACILLIN AND TAZOBACTAM 25 GRAM(S): 4; .5 INJECTION, POWDER, LYOPHILIZED, FOR SOLUTION INTRAVENOUS at 13:53

## 2023-03-19 RX ADMIN — PIPERACILLIN AND TAZOBACTAM 25 GRAM(S): 4; .5 INJECTION, POWDER, LYOPHILIZED, FOR SOLUTION INTRAVENOUS at 21:59

## 2023-03-19 NOTE — PROGRESS NOTE ADULT - SUBJECTIVE AND OBJECTIVE BOX
Patient is a 68y old  Male who presents with a chief complaint of Abd pain  Fever 102.7 F  Peritoneal fluid collection (19 Mar 2023 08:52)      Vital Signs Last 24 Hrs  T(C): 37 (19 Mar 2023 08:54), Max: 37.3 (18 Mar 2023 15:38)  T(F): 98.6 (19 Mar 2023 08:54), Max: 99.1 (18 Mar 2023 15:38)  HR: 59 (19 Mar 2023 08:54) (58 - 71)  BP: 119/53 (19 Mar 2023 08:54) (98/59 - 128/78)  BP(mean): --  RR: 17 (19 Mar 2023 08:54) (17 - 18)  SpO2: 97% (19 Mar 2023 08:54) (96% - 97%)    Parameters below as of 19 Mar 2023 08:54  Patient On (Oxygen Delivery Method): room air        LABS:                        13.0   7.18  )-----------( 280      ( 19 Mar 2023 06:26 )             39.4     03-19    138  |  105  |  18  ----------------------------<  110<H>  3.2<L>   |  29  |  1.02    Ca    9.3      19 Mar 2023 06:26  Phos  3.2     03-19  Mg     2.2     03-19    TPro  6.4  /  Alb  2.4<L>  /  TBili  0.7  /  DBili  x   /  AST  37  /  ALT  47  /  AlkPhos  97  03-19    PT/INR - ( 17 Mar 2023 12:51 )   PT: 15.9 sec;   INR: 1.37 ratio         PTT - ( 17 Mar 2023 12:51 )  PTT:25.4 sec  Urinalysis Basic - ( 17 Mar 2023 11:16 )    Color: Yellow / Appearance: Clear / S.010 / pH: x  Gluc: x / Ketone: Negative  / Bili: Negative / Urobili: 4   Blood: x / Protein: 30 mg/dL / Nitrite: Negative   Leuk Esterase: Trace / RBC: 0-2 /HPF / WBC 6-10 /HPF   Sq Epi: x / Non Sq Epi: Occasional / Bacteria: Occasional  AAO x 3   Normal respiratory effort  Normal peripheral circulation  IR drain: straw colored fluid             Culture Results:   Moderate Streptococcus agalactiae (Group B) isolated  Group B streptococci are susceptible to ampicillin,  penicillin and cefazolin, but may be resistant to  erythromycin and clindamycin.  Recommendations for intrapartum prophylaxis for Group B  streptococci are penicillin or ampicillin. (23 @ 16:46)  Culture Results:   <10,000 CFU/mL Normal Urogenital Crys (23 @ 11:16)  Culture Results:   No growth to date. (23 @ 10:27)  Culture Results:   No growth to date. (23 @ 10:27)

## 2023-03-19 NOTE — PROGRESS NOTE ADULT - SUBJECTIVE AND OBJECTIVE BOX
Date of service: 23 @ 08:53    Lying in bed in NAD  Has perineal discomfort  Has low grade fever  Drain in place    ROS: denies dizziness, no HA, no SOB or cough, no abdominal pain, no diarrhea or constipation; no dysuria, no legs pain, no rashes    MEDICATIONS  (STANDING):  enoxaparin Injectable 40 milliGRAM(s) SubCutaneous every 24 hours  gabapentin 100 milliGRAM(s) Oral every 8 hours  metoprolol succinate ER 50 milliGRAM(s) Oral daily  piperacillin/tazobactam IVPB.. 3.375 Gram(s) IV Intermittent every 8 hours  valsartan 160 milliGRAM(s) Oral daily    Vital Signs Last 24 Hrs  T(C): 36.9 (19 Mar 2023 05:20), Max: 37.3 (18 Mar 2023 15:38)  T(F): 98.5 (19 Mar 2023 05:20), Max: 99.1 (18 Mar 2023 15:38)  HR: 58 (19 Mar 2023 05:20) (58 - 71)  BP: 128/78 (19 Mar 2023 05:20) (98/59 - 128/78)  BP(mean): --  RR: 18 (19 Mar 2023 05:20) (18 - 18)  SpO2: 97% (19 Mar 2023 05:20) (96% - 97%)    Parameters below as of 19 Mar 2023 05:20  Patient On (Oxygen Delivery Method): room air     Physical exam:    Constitutional:  No acute distress  HEENT: NC/AT, EOMI, PERRLA, conjunctivae clear; ears and nose atraumatic  Neck: supple; thyroid not palpable  Back: no tenderness  Respiratory: respiratory effort normal; clear to auscultation  Cardiovascular: S1S2 regular, no murmurs  Abdomen: soft, not tender, not distended, positive BS  Genitourinary: no suprapubic tenderness  Lymphatic: no LN palpable  Musculoskeletal: no muscle tenderness, no joint swelling or tenderness  Extremities: no pedal edema  Neurological/ Psychiatric: AxOx3, moving all extremities  Skin: no rashes; no palpable lesions    Labs: reviewed                        13.0   7.18  )-----------( 280      ( 19 Mar 2023 06:26 )             39.4     -    138  |  105  |  18  ----------------------------<  110<H>  3.2<L>   |  29  |  1.02    Ca    9.3      19 Mar 2023 06:26  Phos  3.2     03-19  Mg     2.2     03-19    TPro  6.4  /  Alb  2.4<L>  /  TBili  0.7  /  DBili  x   /  AST  37  /  ALT  47  /  AlkPhos  97  03-19                        12.6   8.39  )-----------( 254      ( 18 Mar 2023 08:05 )             37.4     03-18    137  |  103  |  17  ----------------------------<  92  2.7<LL>   |  29  |  0.89    Ca    8.9      18 Mar 2023 08:05  Phos  2.5     03-18  Mg     2.2     03-18    TPro  7.4  /  Alb  2.7<L>  /  TBili  2.1<H>  /  DBili  x   /  AST  30  /  ALT  25  /  AlkPhos  84  03-17     LIVER FUNCTIONS - ( 17 Mar 2023 10:27 )  Alb: 2.7 g/dL / Pro: 7.4 gm/dL / ALK PHOS: 84 U/L / ALT: 25 U/L / AST: 30 U/L / GGT: x           Urinalysis Basic - ( 17 Mar 2023 11:16 )    Color: Yellow / Appearance: Clear / S.010 / pH: x  Gluc: x / Ketone: Negative  / Bili: Negative / Urobili: 4   Blood: x / Protein: 30 mg/dL / Nitrite: Negative   Leuk Esterase: Trace / RBC: 0-2 /HPF / WBC 6-10 /HPF   Sq Epi: x / Non Sq Epi: Occasional / Bacteria: Occasional      Culture - Abscess with Gram Stain (collected 17 Mar 2023 16:46)  Source: .Abscess pelvic drainage  Preliminary Report (18 Mar 2023 16:27):    Moderate Streptococcus agalactiae (Group B) isolated    Group B streptococci are susceptible to ampicillin,    penicillin and cefazolin, but may be resistant to    erythromycin and clindamycin.    Recommendations for intrapartum prophylaxis for Group B    streptococci are penicillin or ampicillin.    Culture - Urine (collected 17 Mar 2023 11:16)  Source: Clean Catch None  Final Report (18 Mar 2023 11:05):    <10,000 CFU/mL Normal Urogenital Crys    Culture - Blood (collected 17 Mar 2023 10:27)  Source: .Blood None  Preliminary Report (18 Mar 2023 16:01):    No growth to date.    Culture - Blood (collected 17 Mar 2023 10:27)  Source: .Blood None  Preliminary Report (18 Mar 2023 16:01):    No growth to date.    Radiology: all available radiological tests reviewed    < from: CT Abdomen and Pelvis w/ Oral Cont and w/ IV Cont (23 @ 11:48) >  Lower abdominal/pelvic fluid collection (13.1 x 7.3 x 9.1 cm) anterior to the urinary bladder.  Urinary bladder wall thickening and inflammatory changes, possibly reactive.  < end of copied text >    Advanced directives addressed: full resuscitation

## 2023-03-19 NOTE — PROGRESS NOTE ADULT - SUBJECTIVE AND OBJECTIVE BOX
Chief complain :  Abd pain, Fever 102.7 F  69 y/o Male with a PMHx of prostatectomy and lymph node removal in Dec 2022 at [facility] who was sent by his urologist, Dr. Ruiz,  to [facility] ED on 3/17/23  for  pelvic pain.   Dr. Ruiz noted pt had  dark colored urine and hematuria.  He who found pt has  fluid retention,   accumulation of fluid in the peritoneal cavity,  at the site of lymph nodes removal.  For the  past week pt has had intermittent fevers/chills, nausea and dry heaving, diarrhea, and general myalgias.   He denies respiratory complaints,  no chest pain/SOB,  no urinary burning or pain.       3/18 -  Patient seen and examined at bedside earlier today, denies cp, dyspnea, cough, abdominal pain minimal at site of the drain, tolerating po intake, afebrile  3/19 - no cp palps sob abdo pain; drain with samira-colored fluid     Physical exam:   T(F): 98.5 (19 Mar 2023 15:17), Max: 99 (18 Mar 2023 21:34)  HR: 69 (19 Mar 2023 15:17) (58 - 71)  BP: 116/61 (19 Mar 2023 15:17) (98/59 - 128/78)  RR: 18 (19 Mar 2023 15:17) (17 - 18)  SpO2: 96% (19 Mar 2023 15:17) (96% - 97%)/RA   General : NAD, appear to be of stated age, sitting up in chair, conversant   NERVOUS SYSTEM:  Alert & Oriented X3, non- focal exam, Motor Strength 5/5 B/L upper and lower extremities; DTRs 2+ intact and symmetric  HEAD:  Atraumatic, Normocephalic  EYES: EOMI, PERRLA, conjunctiva and sclera clear  HEENT: Moist mucous membranes, Supple neck , No JVD  CHEST: Clear to auscultation bilaterally; No rales, no rhonchi, no wheezing  HEART: Regular rate and rhythm; No murmurs, no rubs or gallops  ABDOMEN: Soft, Non-tender, Non-distended; Bowel sounds present, no guarding , no peritoneal irritation , RLQ drain in place  GENITOURINARY- Voiding, no suprapubic tenderness  EXTREMITIES:  2+ Peripheral Pulses, No clubbing, cyanosis,   edema  MUSCULOSKELETAL:- No muscle tenderness, Muscle tone normal, No joint tenderness, no Joint swelling,  Joint ROM –normal   SKIN-no rash, no lesion  Drain with samira-colored fluid     CT Abdomen and Pelvis w/ Oral Cont and w/ IV Cont (03.17.23 @ 11:48) >  Lower abdominal/pelvic fluid collection (13.1 x 7.3 x 9.1 cm) anterior to   the urinary bladder.  Urinary bladder wall thickening and inflammatory changes, possibly   reactive.    Xray Chest 1 View- PORTABLE-Urgent (03.17.23 @ 11:03) >  There is a small leftpleural effusion. The heart size is mildly   enlarged. There is levoscoliosis of the upper thoracic spine.  Impression: Small left pleural effusion. Mild cardiomegaly.      RECENT CULTURES:  Culture - Abscess with Gram Stain (03.17.23 @ 16:46)    Specimen Source: .Abscess pelvic drainage    Culture Results:   Moderate Streptococcus agalactiae (Group B) isolated  Group B streptococci are susceptible to ampicillin,  penicillin and cefazolin, but may be resistant to  erythromycin and clindamycin.  Recommendations for intrapartum prophylaxis for Group B  streptococci are penicillin or ampicillin.    LABS: All Labs Reviewed:                      13.0   7.18  )-----------( 280      ( 19 Mar 2023 06:26 )             39.4   138  |  105  |  18  ----------------------------<  110<H>  3.2<L>   |  29  |  1.02  Ca    9.3      19 Mar 2023 06:26  Phos  3.2     03-19  Mg     2.2     03-19  TPro  6.4  /  Alb  2.4<L>  /  TBili  0.7  /  DBili  x   /  AST  37  /  ALT  47  /  AlkPhos  97  03-19    MEDS:   enoxaparin Injectable 40 milliGRAM(s) SubCutaneous every 24 hours  gabapentin 100 milliGRAM(s) Oral every 8 hours  ibuprofen  Tablet. 200 milliGRAM(s) Oral every 6 hours PRN  metoprolol succinate ER 50 milliGRAM(s) Oral daily  piperacillin/tazobactam IVPB.. 3.375 Gram(s) IV Intermittent every 8 hours  valsartan 160 milliGRAM(s) Oral daily

## 2023-03-20 VITALS
OXYGEN SATURATION: 96 % | DIASTOLIC BLOOD PRESSURE: 70 MMHG | SYSTOLIC BLOOD PRESSURE: 120 MMHG | TEMPERATURE: 99 F | RESPIRATION RATE: 18 BRPM | HEART RATE: 55 BPM

## 2023-03-20 LAB
ANION GAP SERPL CALC-SCNC: 5 MMOL/L — SIGNIFICANT CHANGE UP (ref 5–17)
BUN SERPL-MCNC: 16 MG/DL — SIGNIFICANT CHANGE UP (ref 7–23)
CALCIUM SERPL-MCNC: 9.4 MG/DL — SIGNIFICANT CHANGE UP (ref 8.5–10.1)
CHLORIDE SERPL-SCNC: 110 MMOL/L — HIGH (ref 96–108)
CO2 SERPL-SCNC: 29 MMOL/L — SIGNIFICANT CHANGE UP (ref 22–31)
CREAT SERPL-MCNC: 1.02 MG/DL — SIGNIFICANT CHANGE UP (ref 0.5–1.3)
EGFR: 80 ML/MIN/1.73M2 — SIGNIFICANT CHANGE UP
GLUCOSE SERPL-MCNC: 106 MG/DL — HIGH (ref 70–99)
POTASSIUM SERPL-MCNC: 3.4 MMOL/L — LOW (ref 3.5–5.3)
POTASSIUM SERPL-SCNC: 3.4 MMOL/L — LOW (ref 3.5–5.3)
SODIUM SERPL-SCNC: 144 MMOL/L — SIGNIFICANT CHANGE UP (ref 135–145)

## 2023-03-20 PROCEDURE — 99239 HOSP IP/OBS DSCHRG MGMT >30: CPT

## 2023-03-20 PROCEDURE — 99232 SBSQ HOSP IP/OBS MODERATE 35: CPT

## 2023-03-20 RX ORDER — VALSARTAN 80 MG/1
1 TABLET ORAL
Qty: 30 | Refills: 0
Start: 2023-03-20 | End: 2023-04-18

## 2023-03-20 RX ORDER — POTASSIUM CHLORIDE 20 MEQ
40 PACKET (EA) ORAL ONCE
Refills: 0 | Status: COMPLETED | OUTPATIENT
Start: 2023-03-20 | End: 2023-03-20

## 2023-03-20 RX ORDER — GABAPENTIN 400 MG/1
1 CAPSULE ORAL
Qty: 0 | Refills: 0 | DISCHARGE
Start: 2023-03-20

## 2023-03-20 RX ORDER — CEFUROXIME AXETIL 250 MG
1 TABLET ORAL
Qty: 28 | Refills: 0
Start: 2023-03-20 | End: 2023-04-02

## 2023-03-20 RX ADMIN — PIPERACILLIN AND TAZOBACTAM 25 GRAM(S): 4; .5 INJECTION, POWDER, LYOPHILIZED, FOR SOLUTION INTRAVENOUS at 15:05

## 2023-03-20 RX ADMIN — VALSARTAN 160 MILLIGRAM(S): 80 TABLET ORAL at 10:13

## 2023-03-20 RX ADMIN — Medication 50 MILLIGRAM(S): at 10:14

## 2023-03-20 RX ADMIN — ENOXAPARIN SODIUM 40 MILLIGRAM(S): 100 INJECTION SUBCUTANEOUS at 05:53

## 2023-03-20 RX ADMIN — PIPERACILLIN AND TAZOBACTAM 25 GRAM(S): 4; .5 INJECTION, POWDER, LYOPHILIZED, FOR SOLUTION INTRAVENOUS at 05:53

## 2023-03-20 RX ADMIN — Medication 40 MILLIEQUIVALENT(S): at 15:06

## 2023-03-20 NOTE — DISCHARGE NOTE NURSING/CASE MANAGEMENT/SOCIAL WORK - PATIENT PORTAL LINK FT
You can access the FollowMyHealth Patient Portal offered by [facility] by registering at the following website: [email or web address]. By joining [facility]’s FollowMyHealth portal, you will also be able to view your health information using other applications (apps) compatible with our system.

## 2023-03-20 NOTE — DISCHARGE NOTE PROVIDER - NSDCMRMEDTOKEN_GEN_ALL_CORE_FT
Advil 200 mg oral tablet: 1 tab(s) orally every 6 hours, As Needed  cefuroxime 500 mg oral tablet: 1 tab(s) orally every 12 hours   gabapentin 100 mg oral capsule: 1 cap(s) orally every 8 hours  Metoprolol Succinate ER 50 mg oral tablet, extended release: 1 tab(s) orally once a day  valsartan-hydrochlorothiazide 160 mg-25 mg oral tablet: 1 tab(s) orally once a day   Advil 200 mg oral tablet: 1 tab(s) orally every 6 hours, As Needed  cefuroxime 500 mg oral tablet: 1 tab(s) orally every 12 hours   gabapentin 100 mg oral capsule: 1 cap(s) orally every 8 hours  Metoprolol Succinate ER 50 mg oral tablet, extended release: 1 tab(s) orally once a day  valsartan 160 mg oral tablet: 1 tab(s) orally once a day

## 2023-03-20 NOTE — DISCHARGE NOTE NURSING/CASE MANAGEMENT/SOCIAL WORK - NSDCPEFALRISK_GEN_ALL_CORE
For information on Fall & Injury Prevention, visit: [email or web address]  [email or web address] OR  https://www.cdc.gov/steadi/patient.html

## 2023-03-20 NOTE — PROGRESS NOTE ADULT - REASON FOR ADMISSION
Abd pain  Fever 102.7 F  Peritoneal fluid collection

## 2023-03-20 NOTE — PROGRESS NOTE ADULT - ASSESSMENT
67 y/o Male with a PMHx of prostatectomy and lymph node removal in Dec 2022 at [facility] who was sent by his urologist, Dr. Ruiz,  to [facility] on 3/17/23  for  pelvic pain.   Dr. Ruiz noted pt had  dark colored urine and hematuria.  He who found pt has  fluid retention,   accumulation of fluid in the peritoneal cavity,  at the site of lymph nodes removal.  For the  past week pt has had intermittent fevers/chills, nausea and dry heaving, diarrhea, and general myalgias.   He denies respiratory complaints,  no chest pain/SOB,  no urinary burning or pain.       Lower abdominal pain due to Pelvic collections probable abscess vs lymphatic fluid collection s/p drain placement with 360 cc fluid on 3/17/23   Prostate cancer s/p recent prostatectomy   - as per urology   - CT showed : Lower abdominal/pelvic fluid collection measuring approximately 13.1 x 7.3 x 9.1 cm and located anterior to the urinary bladder    - afebrile, blood cx neg , urine cx neg   - collection fluids culture + streptococcus agalactiae   - ID consult appreciated   - c/w Zosyn ( pt has known allergy to penicillin but tolerates Zosyn)     Hypokalemia, severe   - replace, stop HCTZ     HTN   - c/w BB, ARB  - hold HCTZ     Pyuria ruled out UTI   - c/w zosyn for pelvic possible abscess     DVT proph: lovenox    Full Code  
Bladder scan 12 to 28 cc.   Will check Creatinine on IR drain fluid.  Continue antibiotics, follow cultures, treat per sensitivity. ID following. 
IR Drain fluid Cr not concerning for urine leak.   Being discharged with drain on oral antibiotics per ID.   Will follow with Dr Delacruz.
69 y/o Male with h/o prostate Ca s/p prostatectomy and lymph node removal in Dec 2022 at [facility] was admitted on 3/17 after he was sent by his urologist, Dr. Ruiz to [place] ED for worsening pelvic pain. Dr. Ruiz noted pt had dark colored urine and hematuria. He was also noted with accumulation of fluid in the peritoneal cavity at the site of lymph nodes removal. For the  past week pt has had intermittent fevers/chills, nausea and dry heaving, diarrhea, and general myalgias. In ER he received zosyn. On 3/18 drain placed into pelvic collection, 360cc clear yellow fluid removed, drain left in place    1. Febrile syndrome. Possible sepsis. Pelvic abscess with strep gr B s/p drainage. Prostate Ca s/p recent prostatectomy. Allergy to PCN.   -BC x 2, fluid collection c/s noted  -fluid collection ?urine ?lymphatic  -he tolerated zosyn well so far despite his h/o PCN allergy  -on zosyn 3.375 gm IV q8h # 3  -tolerating abx well so far; no side effects noted  -monitor closely in [place] of PCN allergy history  -case d/w with urologist Dr. Delacruz, abx choices reviewed  -will change abx to ceftin 500 mg PO q12h for 2 more weeks  -f/u as outpatient as urologist  -f/u cultures  -continue abx coverage   -monitor temps  -f/u CBC  -supportive care  2. Other issues:   -care per medicine    d/w Dr. Lloyd  
69 y/o Male with h/o prostate Ca s/p prostatectomy and lymph node removal in Dec 2022 at [facility] was admitted on 3/17 after he was sent by his urologist, Dr. Ruiz to [place] ED for worsening pelvic pain. Dr. Ruiz noted pt had dark colored urine and hematuria. He was also noted with accumulation of fluid in the peritoneal cavity at the site of lymph nodes removal. For the  past week pt has had intermittent fevers/chills, nausea and dry heaving, diarrhea, and general myalgias. In ER he received zosyn. On 3/18 drain placed into pelvic collection, 360cc clear yellow fluid removed, drain left in place    1. Febrile syndrome. Possible sepsis. Pelvic abscess with strep gr B s/p drainage. Prostate Ca s/p recent prostatectomy. Allergy to PCN.   -BC x 2, fluid collection c/s noted  -he tolerated zosyn well so far despite his h/o PCN allergy  -on zosyn 3.375 gm IV q8h # 2  -tolerating abx well so far; no side effects noted  -monitor closely in [place] of PCN allergy history  -f/u cultures  -continue abx coverage   -monitor temps  -f/u CBC  -supportive care  2. Other issues:   -care per medicine    d/w Dr. Lloyd  
 67 y/o Male with a PMHx of prostatectomy and lymph node removal in Dec 2022 at [facility] who was sent by his urologist, Dr. Ruiz,  to [place] ED on 3/17/23  for  pelvic pain.   Dr. Ruiz noted pt had  dark colored urine and hematuria.  He who found pt has  fluid retention,   accumulation of fluid in the peritoneal cavity,  at the site of lymph nodes removal.  For the  past week pt has had intermittent fevers/chills, nausea and dry heaving, diarrhea, and general myalgias.   He denies respiratory complaints,  no chest pain/SOB,  no urinary burning or pain.       Lower abdominal pain due to Pelvic collections probable abscess vs lymphatic fluid collection s/p drain placement with 360 cc fluid on 3/17/23   Prostate cancer s/p recent prostatectomy   - CT showed : Lower abdominal/pelvic fluid collection measuring approximately 13.1 x 7.3 x 9.1 cm and located anterior to the urinary bladder    - afebrile, blood cx neg , urine cx neg   - collection fluids culture + streptococcus agalactiae   - ID consult appreciated   - c/w Zosyn ( pt has known allergy to penicillin but tolerates Zosyn)   - Drain with samira-colored fluid, possibly urine - check Creatinine level     Hypokalemia, severe   - stop HCTZ   - keep K4, Mg 2, replete prn     HTN   - c/w BB, ARB  - hold HCTZ     Pyuria ruled out UTI   - c/w zosyn for pelvic possible abscess     DVT proph: lovenox    Full Code  POC discussed with Dr Botello  Pt's Urologist is Dr Forman

## 2023-03-20 NOTE — DISCHARGE NOTE PROVIDER - HOSPITAL COURSE
69 y/o Male with a PMHx of prostatectomy and lymph node removal in Dec 2022 at [facility] who was sent by his urologist, Dr. Ruiz,  to [place] ED on 3/17/23  for  pelvic pain.   Dr. Ruiz noted pt had  dark colored urine and hematuria.  He who found pt has  fluid retention,   accumulation of fluid in the peritoneal cavity,  at the site of lymph nodes removal.  For the  past week pt has had intermittent fevers/chills, nausea and dry heaving, diarrhea, and general myalgias.   He denies respiratory complaints,  no chest pain/SOB,  no urinary burning or pain.       HOSPITAL COURSE:   Lower abdominal pain due to Pelvic collections probable abscess vs lymphatic fluid collection s/p drain placement with 360 cc fluid on 3/17/23   Prostate cancer s/p recent prostatectomy   - CT showed : Lower abdominal/pelvic fluid collection measuring approximately 13.1 x 7.3 x 9.1 cm and located anterior to the urinary bladder    - afebrile, blood cx neg , urine cx neg   - collection fluids culture + streptococcus agalactiae   - c/w Zosyn ( pt has known allergy to penicillin but tolerates Zosyn), discussed with ID rec ceftin for two weeks   - Drain with samira-colored fluid, possibly urine - check Creatinine level     Hypokalemia, severe   - stop HCTZ   - keep K4, Mg 2, replete prn     HTN   - c/w BB, ARB  - hold HCTZ     DVT proph: lovenox    Full Code  POC discussed with Dr Botello  Pt's Urologist is Dr Delacruz       OTHER DETAILS:   3/20 - no cp palps sob abdo pain; is ambulating, voiding urine     Vital Signs Last 24 Hrs  T(F): 98.8 (20 Mar 2023 09:12), Max: 98.8 (20 Mar 2023 09:12)  HR: 64 (20 Mar 2023 09:12) (62 - 69)  BP: 111/60 (20 Mar 2023 09:12) (106/85 - 116/61)  RR: 17 (20 Mar 2023 09:12) (17 - 18)  SpO2: 98% (20 Mar 2023 09:12) (96% - 98%)/RA   General : NAD, appear to be of stated age, sitting up in chair, conversant   NERVOUS SYSTEM:  Alert & Oriented X3, non- focal exam, Motor Strength 5/5 B/L upper and lower extremities; DTRs 2+ intact and symmetric  HEAD:  Atraumatic, Normocephalic  EYES: EOMI, PERRLA, conjunctiva and sclera clear  HEENT: Moist mucous membranes, Supple neck , No JVD  CHEST: Clear to auscultation bilaterally; No rales, no rhonchi, no wheezing  HEART: Regular rate and rhythm; No murmurs, no rubs or gallops  ABDOMEN: Soft, Non-tender, Non-distended; Bowel sounds present, no guarding , no peritoneal irritation , RLQ drain in place  GENITOURINARY- Voiding, no suprapubic tenderness  EXTREMITIES:  2+ Peripheral Pulses, No clubbing, cyanosis,   edema  MUSCULOSKELETAL:- No muscle tenderness, Muscle tone normal, No joint tenderness, no Joint swelling,  Joint ROM –normal   SKIN-no rash, no lesion  Drain with samira-colored fluid     CT Abdomen and Pelvis w/ Oral Cont and w/ IV Cont (03.17.23 @ 11:48) >  Lower abdominal/pelvic fluid collection (13.1 x 7.3 x 9.1 cm) anterior to   the urinary bladder.  Urinary bladder wall thickening and inflammatory changes, possibly   reactive.    RECENT CULTURES:  Culture - Abscess with Gram Stain (03.17.23 @ 16:46)    Specimen Source: .Abscess pelvic drainage    Culture Results:   Moderate Streptococcus agalactiae (Group B) isolated  Group B streptococci are susceptible to ampicillin,  penicillin and cefazolin, but may be resistant to  erythromycin and clindamycin.  Recommendations for intrapartum prophylaxis for Group B  streptococci are penicillin or ampicillin.    LABS: All Labs Reviewed:                    13.0   7.18  )-----------( 280      ( 19 Mar 2023 06:26 )             39.4   144  |  110<H>  |  16  ----------------------------<  106<H>  3.4<L>   |  29  |  1.02  Ca    9.4      20 Mar 2023 07:16  Phos  3.2     03-19  Mg     2.2     03-19  TPro  6.4  /  Alb  2.4<L>  /  TBili  0.7  /  DBili  x   /  AST  37  /  ALT  47  /  AlkPhos  97  03-19    time spent on discharge - 55 mins

## 2023-03-20 NOTE — PROGRESS NOTE ADULT - SUBJECTIVE AND OBJECTIVE BOX
Patient is a 68y old  Male who presents with a chief complaint of Abd pain  Fever 102.7 F  Peritoneal fluid collection (20 Mar 2023 14:53)      Vital Signs Last 24 Hrs  T(C): 37.1 (20 Mar 2023 09:12), Max: 37.1 (20 Mar 2023 09:12)  T(F): 98.8 (20 Mar 2023 09:12), Max: 98.8 (20 Mar 2023 09:12)  HR: 64 (20 Mar 2023 09:12) (62 - 66)  BP: 111/60 (20 Mar 2023 09:12) (106/85 - 111/60)  BP(mean): --  RR: 17 (20 Mar 2023 09:12) (17 - 18)  SpO2: 98% (20 Mar 2023 09:12) (96% - 98%)    Parameters below as of 20 Mar 2023 09:12  Patient On (Oxygen Delivery Method): room air        LABS:                        13.0   7.18  )-----------( 280      ( 19 Mar 2023 06:26 )             39.4     03-20    144  |  110<H>  |  16  ----------------------------<  106<H>  3.4<L>   |  29  |  1.02    Ca    9.4      20 Mar 2023 07:16  Phos  3.2     03-19  Mg     2.2     03-19    TPro  6.4  /  Alb  2.4<L>  /  TBili  0.7  /  DBili  x   /  AST  37  /  ALT  47  /  AlkPhos  97  03-19              Culture Results:   Moderate Streptococcus agalactiae (Group B) isolated  Group B streptococci are susceptible to ampicillin,  penicillin and cefazolin, but may be resistant to  erythromycin and clindamycin.  Recommendations for intrapartum prophylaxis for Group B  streptococci are penicillin or ampicillin. (03-17-23 @ 16:46)     Patient is a 68y old  Male who presents with a chief complaint of Abd pain  Fever 102.7 F  Peritoneal fluid collection (20 Mar 2023 14:53)      Vital Signs Last 24 Hrs  T(C): 37.1 (20 Mar 2023 09:12), Max: 37.1 (20 Mar 2023 09:12)  T(F): 98.8 (20 Mar 2023 09:12), Max: 98.8 (20 Mar 2023 09:12)  HR: 64 (20 Mar 2023 09:12) (62 - 66)  BP: 111/60 (20 Mar 2023 09:12) (106/85 - 111/60)  BP(mean): --  RR: 17 (20 Mar 2023 09:12) (17 - 18)  SpO2: 98% (20 Mar 2023 09:12) (96% - 98%)    Parameters below as of 20 Mar 2023 09:12  Patient On (Oxygen Delivery Method): room air  AAO x 3   Normal respiratory effort  Normal peripheral circulation  IR drain: straw colored fluid.         LABS:                        13.0   7.18  )-----------( 280      ( 19 Mar 2023 06:26 )             39.4     03-20    144  |  110<H>  |  16  ----------------------------<  106<H>  3.4<L>   |  29  |  1.02    Ca    9.4      20 Mar 2023 07:16  Phos  3.2     03-19  Mg     2.2     03-19    TPro  6.4  /  Alb  2.4<L>  /  TBili  0.7  /  DBili  x   /  AST  37  /  ALT  47  /  AlkPhos  97  03-19              Culture Results:   Moderate Streptococcus agalactiae (Group B) isolated  Group B streptococci are susceptible to ampicillin,  penicillin and cefazolin, but may be resistant to  erythromycin and clindamycin.  Recommendations for intrapartum prophylaxis for Group B  streptococci are penicillin or ampicillin. (03-17-23 @ 16:46)

## 2023-03-20 NOTE — PROGRESS NOTE ADULT - SUBJECTIVE AND OBJECTIVE BOX
Date of service: 23 @ 11:21    Sitting in bed in NAD  Feels better  Fever is down  Drain in place  Has urinary frequency    ROS: no fever or chills; denies dizziness, no HA, no SOB or cough, no abdominal pain, no diarrhea or constipation; no legs pain, no rashes    MEDICATIONS  (STANDING):  enoxaparin Injectable 40 milliGRAM(s) SubCutaneous every 24 hours  gabapentin 100 milliGRAM(s) Oral every 8 hours  metoprolol succinate ER 50 milliGRAM(s) Oral daily  piperacillin/tazobactam IVPB.. 3.375 Gram(s) IV Intermittent every 8 hours  valsartan 160 milliGRAM(s) Oral daily    Vital Signs Last 24 Hrs  T(C): 37.1 (20 Mar 2023 09:12), Max: 37.1 (20 Mar 2023 09:12)  T(F): 98.8 (20 Mar 2023 09:12), Max: 98.8 (20 Mar 2023 09:12)  HR: 64 (20 Mar 2023 09:12) (62 - 69)  BP: 111/60 (20 Mar 2023 09:12) (106/85 - 116/61)  BP(mean): --  RR: 17 (20 Mar 2023 09:12) (17 - 18)  SpO2: 98% (20 Mar 2023 09:12) (96% - 98%)    Parameters below as of 20 Mar 2023 09:12  Patient On (Oxygen Delivery Method): room air     Physical exam:    Constitutional:  No acute distress  HEENT: NC/AT, EOMI, PERRLA, conjunctivae clear; ears and nose atraumatic  Neck: supple; thyroid not palpable  Back: no tenderness  Respiratory: respiratory effort normal; clear to auscultation  Cardiovascular: S1S2 regular, no murmurs  Abdomen: soft, not tender, not distended, positive BS  Genitourinary: no suprapubic tenderness  Lymphatic: no LN palpable  Musculoskeletal: no muscle tenderness, no joint swelling or tenderness  Extremities: no pedal edema  Neurological/ Psychiatric: AxOx3, moving all extremities  Skin: no rashes; no palpable lesions    Labs                        13.0   7.18  )-----------( 280      ( 19 Mar 2023 06:26 )             39.4     03-19    138  |  105  |  18  ----------------------------<  110<H>  3.2<L>   |  29  |  1.02    Ca    9.3      19 Mar 2023 06:26  Phos  3.2     03-19  Mg     2.2     03-19    TPro  6.4  /  Alb  2.4<L>  /  TBili  0.7  /  DBili  x   /  AST  37  /  ALT  47  /  AlkPhos  97  03-19                        12.6   8.39  )-----------( 254      ( 18 Mar 2023 08:05 )             37.4     03-18    137  |  103  |  17  ----------------------------<  92  2.7<LL>   |  29  |  0.89    Ca    8.9      18 Mar 2023 08:05  Phos  2.5     03-18  Mg     2.2     03-18    TPro  7.4  /  Alb  2.7<L>  /  TBili  2.1<H>  /  DBili  x   /  AST  30  /  ALT  25  /  AlkPhos  84  03-17     LIVER FUNCTIONS - ( 17 Mar 2023 10:27 )  Alb: 2.7 g/dL / Pro: 7.4 gm/dL / ALK PHOS: 84 U/L / ALT: 25 U/L / AST: 30 U/L / GGT: x           Urinalysis Basic - ( 17 Mar 2023 11:16 )    Color: Yellow / Appearance: Clear / S.010 / pH: x  Gluc: x / Ketone: Negative  / Bili: Negative / Urobili: 4   Blood: x / Protein: 30 mg/dL / Nitrite: Negative   Leuk Esterase: Trace / RBC: 0-2 /HPF / WBC 6-10 /HPF   Sq Epi: x / Non Sq Epi: Occasional / Bacteria: Occasional      Culture - Abscess with Gram Stain (collected 17 Mar 2023 16:46)  Source: .Abscess pelvic drainage  Preliminary Report (18 Mar 2023 16:27):    Moderate Streptococcus agalactiae (Group B) isolated    Group B streptococci are susceptible to ampicillin,    penicillin and cefazolin, but may be resistant to    erythromycin and clindamycin.    Recommendations for intrapartum prophylaxis for Group B    streptococci are penicillin or ampicillin.    Culture - Urine (collected 17 Mar 2023 11:16)  Source: Clean Catch None  Final Report (18 Mar 2023 11:05):    <10,000 CFU/mL Normal Urogenital Crys    Culture - Blood (collected 17 Mar 2023 10:27)  Source: .Blood None  Preliminary Report (18 Mar 2023 16:01):    No growth to date.    Culture - Blood (collected 17 Mar 2023 10:27)  Source: .Blood None  Preliminary Report (18 Mar 2023 16:01):    No growth to date.    Radiology: all available radiological tests reviewed    < from: CT Abdomen and Pelvis w/ Oral Cont and w/ IV Cont (23 @ 11:48) >  Lower abdominal/pelvic fluid collection (13.1 x 7.3 x 9.1 cm) anterior to the urinary bladder.  Urinary bladder wall thickening and inflammatory changes, possibly reactive.  < end of copied text >    Advanced directives addressed: full resuscitation

## 2023-03-20 NOTE — DISCHARGE NOTE PROVIDER - NSDCCPCAREPLAN_GEN_ALL_CORE_FT
PRINCIPAL DISCHARGE DIAGNOSIS  Diagnosis: Abdominal fluid collection  Assessment and Plan of Treatment: s/p drain.  your culture grew strep agalactiae - please continue antibiotics for two more weeks.  see your urologist this week to follow-up in drain.  if you have fever or increasing drainage or change in the quality of drainage please call your urologist or return to the ER.       PRINCIPAL DISCHARGE DIAGNOSIS  Diagnosis: Abdominal fluid collection  Assessment and Plan of Treatment: s/p drain.  your culture grew strep agalactiae - please continue antibiotics for two more weeks.  see your urologist this week to follow-up in drain.  if you have fever or increasing drainage or change in the quality of drainage please call your urologist or return to the ER.      SECONDARY DISCHARGE DIAGNOSES  Diagnosis: Pleural effusion  Assessment and Plan of Treatment: small  pleural effusion noted on the left side - follow-up imaging with your primary care physician in 4-6 weeks to ensure stability.  If you feel short of breath, speak to your doctor and repeat imaging sooner.

## 2023-03-21 NOTE — CDI QUERY NOTE - NSCDIOTHERTXTBX_GEN_ALL_CORE_HH
Please include more specific documentation in your progress note and discharge summary.  The documentation in this medical record requires additional clarification to accurately capture the patient’s diagnosis/diagnoses, treatment and or severity of illness.     CLINICAL INDICATORS:    Discharge provider note 3/20/2023   Lower abdominal pain due to Pelvic collections probable abscess vs lymphatic fluid collection s/p drain placement with 360 cc fluid on 3/17/23   Prostate cancer s/p recent prostatectomy and lymph node removal in Dec 2022   - CT showed : Lower abdominal/pelvic fluid collection measuring approximately 13.1 x 7.3 x 9.1 cm and located anterior to the urinary bladder    - collection fluids culture + streptococcus agalactiae     Urology progress note 3/20/2023   IR Drain fluid Cr not concerning for urine leak.   Being discharged with drain on oral antibiotics per ID.     Please document all corresponding diagnoses, either known or suspected, of the clinical indicators described above.    - Pelvic abscess is a complication of the recent prostatectomy and lymph node dissection   - Pelvic abscess is not a complication of the recent prostatectomy and lymph node dissection   - Other (please specify) ___________.   - Unable to determine

## 2023-03-22 LAB
CULTURE RESULTS: SIGNIFICANT CHANGE UP
SPECIMEN SOURCE: SIGNIFICANT CHANGE UP

## 2023-04-03 DIAGNOSIS — K65.1 PERITONEAL ABSCESS: ICD-10-CM

## 2023-04-03 DIAGNOSIS — B95.1 STREPTOCOCCUS, GROUP B, AS THE CAUSE OF DISEASES CLASSIFIED ELSEWHERE: ICD-10-CM
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
## 2023-04-03 DIAGNOSIS — T81.43XA INFECTION FOLLOWING A PROCEDURE, ORGAN AND SPACE SURGICAL SITE, INITIAL ENCOUNTER: ICD-10-CM

## 2023-04-03 DIAGNOSIS — R32 UNSPECIFIED URINARY INCONTINENCE: ICD-10-CM

## 2023-04-03 DIAGNOSIS — Z28.21 IMMUNIZATION NOT CARRIED OUT BECAUSE OF PATIENT REFUSAL: ICD-10-CM

## 2023-04-03 DIAGNOSIS — I10 ESSENTIAL (PRIMARY) HYPERTENSION: ICD-10-CM

## 2023-04-03 DIAGNOSIS — C61 MALIGNANT NEOPLASM OF PROSTATE: ICD-10-CM

## 2023-04-03 DIAGNOSIS — Z88.0 ALLERGY STATUS TO PENICILLIN: ICD-10-CM

## 2023-04-03 DIAGNOSIS — E87.6 HYPOKALEMIA: ICD-10-CM

## 2023-04-03 DIAGNOSIS — Z88.5 ALLERGY STATUS TO NARCOTIC AGENT: ICD-10-CM

## 2023-04-03 DIAGNOSIS — Z90.79 ACQUIRED ABSENCE OF OTHER GENITAL ORGAN(S): ICD-10-CM

## 2023-04-03 DIAGNOSIS — A41.9 SEPSIS, UNSPECIFIED ORGANISM: ICD-10-CM

## 2023-04-03 DIAGNOSIS — E66.9 OBESITY, UNSPECIFIED: ICD-10-CM

## 2025-06-07 NOTE — PROVIDER CONTACT NOTE (CRITICAL VALUE NOTIFICATION) - ACTION/TREATMENT ORDERED:
MD called and made aware. New order for Potassium 40 mEq X 2 doses Q4h placed and initiated. Pt asymptomatic. Will continue to monitor pt. Will follow primary team